# Patient Record
Sex: FEMALE | Employment: PART TIME | URBAN - METROPOLITAN AREA
[De-identification: names, ages, dates, MRNs, and addresses within clinical notes are randomized per-mention and may not be internally consistent; named-entity substitution may affect disease eponyms.]

---
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
## 2020-01-06 ENCOUNTER — APPOINTMENT (OUTPATIENT)
Dept: CT IMAGING | Age: 20
End: 2020-01-06
Payer: COMMERCIAL

## 2020-01-06 ENCOUNTER — HOSPITAL ENCOUNTER (EMERGENCY)
Age: 20
Discharge: HOME | End: 2020-01-07
Payer: COMMERCIAL

## 2020-01-06 LAB
BILIRUBIN URINE: NEGATIVE
BLOOD, URINE: NEGATIVE
CLARITY: ABNORMAL
COLOR: YELLOW
GLUCOSE URINE: NEGATIVE MG/DL
HCG, URINE, POC: NEGATIVE
KETONES, URINE: ABNORMAL MG/DL
LEUKOCYTE ESTERASE, URINE: ABNORMAL
Lab: 0
NEGATIVE QC PASS/FAIL: NORMAL
NITRITE, URINE: NEGATIVE
PH UA: 7 (ref 5–9)
POC CREATININE WHOLE BLOOD: 0.6
POSITIVE QC PASS/FAIL: NORMAL
PROTEIN UA: ABNORMAL MG/DL
SPECIFIC GRAVITY UA: 1.03 (ref 1–1.03)
URINE REFLEX TO CULTURE: YES
UROBILINOGEN, URINE: 2 E.U./DL

## 2020-01-06 PROCEDURE — 74177 CT ABD & PELVIS W/CONTRAST: CPT

## 2020-01-06 PROCEDURE — 80053 COMPREHEN METABOLIC PANEL: CPT

## 2020-01-06 PROCEDURE — 6360000004 HC RX CONTRAST MEDICATION: Performed by: PHYSICIAN ASSISTANT

## 2020-01-06 PROCEDURE — 81001 URINALYSIS AUTO W/SCOPE: CPT

## 2020-01-06 PROCEDURE — 85025 COMPLETE CBC W/AUTO DIFF WBC: CPT

## 2020-01-06 PROCEDURE — 36415 COLL VENOUS BLD VENIPUNCTURE: CPT

## 2020-01-06 PROCEDURE — 99284 EMERGENCY DEPT VISIT MOD MDM: CPT

## 2020-01-06 PROCEDURE — 83690 ASSAY OF LIPASE: CPT

## 2020-01-06 ASSESSMENT — PAIN DESCRIPTION - PAIN TYPE: TYPE: ACUTE PAIN

## 2020-01-06 ASSESSMENT — PAIN DESCRIPTION - LOCATION: LOCATION: ABDOMEN

## 2020-01-06 ASSESSMENT — PAIN SCALES - GENERAL: PAINLEVEL_OUTOF10: 8

## 2020-01-06 ASSESSMENT — PAIN DESCRIPTION - ORIENTATION: ORIENTATION: LOWER

## 2020-01-07 VITALS
BODY MASS INDEX: 20.38 KG/M2 | OXYGEN SATURATION: 99 % | RESPIRATION RATE: 16 BRPM | SYSTOLIC BLOOD PRESSURE: 103 MMHG | HEIGHT: 63 IN | TEMPERATURE: 98.3 F | WEIGHT: 115 LBS | DIASTOLIC BLOOD PRESSURE: 62 MMHG | HEART RATE: 91 BPM

## 2020-01-07 LAB
ALBUMIN SERPL-MCNC: 4.4 G/DL (ref 3.5–4.6)
ALP BLD-CCNC: 74 U/L (ref 40–130)
ALT SERPL-CCNC: 7 U/L (ref 0–33)
ANION GAP SERPL CALCULATED.3IONS-SCNC: 13 MEQ/L (ref 9–15)
ANISOCYTOSIS: ABNORMAL
AST SERPL-CCNC: 15 U/L (ref 0–35)
BACTERIA: ABNORMAL /HPF
BASOPHILS ABSOLUTE: 0.1 K/UL (ref 0–0.2)
BASOPHILS RELATIVE PERCENT: 1.1 %
BILIRUB SERPL-MCNC: 0.5 MG/DL (ref 0.2–0.7)
BUN BLDV-MCNC: 14 MG/DL (ref 6–20)
CALCIUM SERPL-MCNC: 8.8 MG/DL (ref 8.5–9.9)
CHLORIDE BLD-SCNC: 99 MEQ/L (ref 95–107)
CO2: 25 MEQ/L (ref 20–31)
CREAT SERPL-MCNC: 0.47 MG/DL (ref 0.5–0.9)
EOSINOPHILS ABSOLUTE: 0 K/UL (ref 0–0.7)
EOSINOPHILS RELATIVE PERCENT: 0.4 %
EPITHELIAL CELLS, UA: ABNORMAL /HPF (ref 0–5)
GFR AFRICAN AMERICAN: >60
GFR AFRICAN AMERICAN: >60
GFR NON-AFRICAN AMERICAN: >60
GFR NON-AFRICAN AMERICAN: >60
GLOBULIN: 3.2 G/DL (ref 2.3–3.5)
GLUCOSE BLD-MCNC: 81 MG/DL (ref 70–99)
HCT VFR BLD CALC: 28.8 % (ref 37–47)
HEMOGLOBIN: 8.5 G/DL (ref 12–16)
HYPOCHROMIA: ABNORMAL
LIPASE: 22 U/L (ref 12–95)
LYMPHOCYTES ABSOLUTE: 1.1 K/UL (ref 1–4.8)
LYMPHOCYTES RELATIVE PERCENT: 8.8 %
MCH RBC QN AUTO: 18.9 PG (ref 27–31.3)
MCHC RBC AUTO-ENTMCNC: 29.5 % (ref 33–37)
MCV RBC AUTO: 64.1 FL (ref 82–100)
MICROCYTES: ABNORMAL
MONOCYTES ABSOLUTE: 0.5 K/UL (ref 0.2–0.8)
MONOCYTES RELATIVE PERCENT: 4.4 %
MUCUS: PRESENT
NEUTROPHILS ABSOLUTE: 10.5 K/UL (ref 1.4–6.5)
NEUTROPHILS RELATIVE PERCENT: 85.3 %
OVALOCYTES: ABNORMAL
PDW BLD-RTO: 18.3 % (ref 11.5–14.5)
PERFORMED ON: NORMAL
PLATELET # BLD: 383 K/UL (ref 130–400)
POC CREATININE: 0.6 MG/DL (ref 0.6–1.1)
POC SAMPLE TYPE: NORMAL
POIKILOCYTES: ABNORMAL
POLYCHROMASIA: ABNORMAL
POTASSIUM SERPL-SCNC: 3.1 MEQ/L (ref 3.4–4.9)
RBC # BLD: 4.49 M/UL (ref 4.2–5.4)
RBC UA: ABNORMAL /HPF (ref 0–5)
SODIUM BLD-SCNC: 137 MEQ/L (ref 135–144)
TARGET CELLS: ABNORMAL
TOTAL PROTEIN: 7.6 G/DL (ref 6.3–8)
WBC # BLD: 12.3 K/UL (ref 4.5–11)
WBC UA: ABNORMAL /HPF (ref 0–5)

## 2020-01-07 PROCEDURE — 6360000002 HC RX W HCPCS: Performed by: PHYSICIAN ASSISTANT

## 2020-01-07 PROCEDURE — 87086 URINE CULTURE/COLONY COUNT: CPT

## 2020-01-07 PROCEDURE — 2580000003 HC RX 258: Performed by: PHYSICIAN ASSISTANT

## 2020-01-07 PROCEDURE — 96374 THER/PROPH/DIAG INJ IV PUSH: CPT

## 2020-01-07 PROCEDURE — 6360000004 HC RX CONTRAST MEDICATION: Performed by: PHYSICIAN ASSISTANT

## 2020-01-07 PROCEDURE — 87077 CULTURE AEROBIC IDENTIFY: CPT

## 2020-01-07 PROCEDURE — 87186 SC STD MICRODIL/AGAR DIL: CPT

## 2020-01-07 RX ORDER — MORPHINE SULFATE 2 MG/ML
2 INJECTION, SOLUTION INTRAMUSCULAR; INTRAVENOUS
Status: DISCONTINUED | OUTPATIENT
Start: 2020-01-07 | End: 2020-01-07 | Stop reason: HOSPADM

## 2020-01-07 RX ORDER — POLYETHYLENE GLYCOL 3350 17 G/17G
17 POWDER, FOR SOLUTION ORAL DAILY
Qty: 1 BOTTLE | Refills: 0 | Status: SHIPPED | OUTPATIENT
Start: 2020-01-07 | End: 2020-01-14

## 2020-01-07 RX ORDER — 0.9 % SODIUM CHLORIDE 0.9 %
1000 INTRAVENOUS SOLUTION INTRAVENOUS ONCE
Status: COMPLETED | OUTPATIENT
Start: 2020-01-07 | End: 2020-01-07

## 2020-01-07 RX ORDER — ONDANSETRON 2 MG/ML
4 INJECTION INTRAMUSCULAR; INTRAVENOUS ONCE
Status: COMPLETED | OUTPATIENT
Start: 2020-01-07 | End: 2020-01-07

## 2020-01-07 RX ORDER — PROMETHAZINE HYDROCHLORIDE 25 MG/1
25 TABLET ORAL EVERY 6 HOURS PRN
Qty: 20 TABLET | Refills: 0 | Status: SHIPPED | OUTPATIENT
Start: 2020-01-07 | End: 2020-01-14

## 2020-01-07 RX ORDER — DICYCLOMINE HYDROCHLORIDE 10 MG/1
10 CAPSULE ORAL EVERY 6 HOURS PRN
Qty: 20 CAPSULE | Refills: 0 | Status: SHIPPED | OUTPATIENT
Start: 2020-01-07 | End: 2020-06-23

## 2020-01-07 RX ORDER — ACETAMINOPHEN 500 MG
1000 TABLET ORAL EVERY 6 HOURS PRN
Qty: 30 TABLET | Refills: 0 | Status: SHIPPED | OUTPATIENT
Start: 2020-01-07 | End: 2020-06-23

## 2020-01-07 RX ADMIN — SODIUM CHLORIDE 1000 ML: 9 INJECTION, SOLUTION INTRAVENOUS at 00:38

## 2020-01-07 RX ADMIN — ONDANSETRON 4 MG: 2 INJECTION INTRAMUSCULAR; INTRAVENOUS at 00:38

## 2020-01-07 RX ADMIN — IOPAMIDOL 100 ML: 755 INJECTION, SOLUTION INTRAVENOUS at 00:05

## 2020-01-07 RX ADMIN — MORPHINE SULFATE 2 MG: 2 INJECTION, SOLUTION INTRAMUSCULAR; INTRAVENOUS at 00:38

## 2020-01-07 ASSESSMENT — ENCOUNTER SYMPTOMS
ABDOMINAL PAIN: 1
DIARRHEA: 0
NAUSEA: 1
BLOOD IN STOOL: 0
VOICE CHANGE: 0
PHOTOPHOBIA: 0
APNEA: 0
ABDOMINAL DISTENTION: 0
EYE DISCHARGE: 0
SHORTNESS OF BREATH: 0
ANAL BLEEDING: 0
BACK PAIN: 0
COUGH: 0

## 2020-01-07 ASSESSMENT — PAIN SCALES - GENERAL: PAINLEVEL_OUTOF10: 8

## 2020-01-07 NOTE — ED PROVIDER NOTES
Psychiatric/Behavioral: Negative for behavioral problems, self-injury and sleep disturbance. All other systems reviewed and are negative. Except as noted above the remainder of the review of systems was reviewed and negative. PAST MEDICAL HISTORY     Past Medical History:   Diagnosis Date    Anemia     History of blood transfusion          SURGICALHISTORY     No past surgical history on file. CURRENT MEDICATIONS       Previous Medications    FERROUS SULFATE 325 (65 FE) MG TABLET    Take 1 tablet by mouth daily (with breakfast)    VITAMIN D (ERGOCALCIFEROL) 99536 UNITS CAPS CAPSULE    Take 50,000 Units by mouth       ALLERGIES     Patient has no known allergies. FAMILY HISTORY     No family history on file.        SOCIAL HISTORY       Social History     Socioeconomic History    Marital status: Single     Spouse name: Not on file    Number of children: Not on file    Years of education: Not on file    Highest education level: Not on file   Occupational History    Not on file   Social Needs    Financial resource strain: Not on file    Food insecurity:     Worry: Not on file     Inability: Not on file    Transportation needs:     Medical: Not on file     Non-medical: Not on file   Tobacco Use    Smoking status: Current Every Day Smoker     Years: 4.00     Types: Cigars    Smokeless tobacco: Never Used    Tobacco comment: black n milds   Substance and Sexual Activity    Alcohol use: No     Alcohol/week: 0.0 standard drinks    Drug use: No    Sexual activity: Yes     Partners: Male   Lifestyle    Physical activity:     Days per week: Not on file     Minutes per session: Not on file    Stress: Not on file   Relationships    Social connections:     Talks on phone: Not on file     Gets together: Not on file     Attends Yarsanism service: Not on file     Active member of club or organization: Not on file     Attends meetings of clubs or organizations: Not on file     Relationship status: Not on file    Intimate partner violence:     Fear of current or ex partner: Not on file     Emotionally abused: Not on file     Physically abused: Not on file     Forced sexual activity: Not on file   Other Topics Concern    Not on file   Social History Narrative    Not on file       SCREENINGS      @[facility]([id number])@      PHYSICAL EXAM    (up to 7 for level 4, 8 or more for level 5)     ED Triage Vitals [01/06/20 2315]   BP Temp Temp Source Pulse Resp SpO2 Height Weight   (!) 102/58 98.3 °F (36.8 °C) Oral 99 18 100 % 5' 3\" (1.6 m) 115 lb (52.2 kg)       Physical Exam  Vitals signs and nursing note reviewed. Constitutional:       General: She is not in acute distress. Appearance: She is well-developed. HENT:      Head: Normocephalic and atraumatic. Eyes:      General:         Right eye: No discharge. Left eye: No discharge. Pupils: Pupils are equal, round, and reactive to light. Neck:      Musculoskeletal: Normal range of motion and neck supple. Cardiovascular:      Rate and Rhythm: Normal rate and regular rhythm. Heart sounds: Normal heart sounds. Pulmonary:      Effort: Pulmonary effort is normal. No respiratory distress. Breath sounds: Normal breath sounds. No stridor. Abdominal:      General: Bowel sounds are normal. There is no distension. Palpations: Abdomen is soft. Tenderness: There is tenderness. Musculoskeletal: Normal range of motion. Skin:     General: Skin is warm. Findings: No erythema. Neurological:      Mental Status: She is alert and oriented to person, place, and time.          DIAGNOSTIC RESULTS     EKG: All EKG's are interpreted by the Emergency Department Physician who either signs or Co-signsthis chart in the absence of a cardiologist.         RADIOLOGY:   Non-plain filmimages such as CT, Ultrasound and MRI are read by the radiologist. Plain radiographic images are visualized and preliminarily interpreted by the emergency physician with the below findings:     see prelim rad report    Interpretation per the Radiologist below, if available at the time ofthis note:    CT ABDOMEN PELVIS W IV CONTRAST Additional Contrast? None    (Results Pending)         ED BEDSIDE ULTRASOUND:   Performed by ED Physician - none    LABS:  Labs Reviewed   URINE RT REFLEX TO CULTURE - Abnormal; Notable for the following components:       Result Value    Clarity, UA CLOUDY (*)     Ketones, Urine TRACE (*)     Protein, UA TRACE (*)     Urobilinogen, Urine 2.0 (*)     Leukocyte Esterase, Urine MODERATE (*)     All other components within normal limits   COMPREHENSIVE METABOLIC PANEL - Abnormal; Notable for the following components:    Potassium 3.1 (*)     CREATININE 0.47 (*)     All other components within normal limits   CBC WITH AUTO DIFFERENTIAL - Abnormal; Notable for the following components:    WBC 12.3 (*)     Hemoglobin 8.5 (*)     Hematocrit 28.8 (*)     MCV 64.1 (*)     MCH 18.9 (*)     MCHC 29.5 (*)     RDW 18.3 (*)     Neutrophils Absolute 10.5 (*)     All other components within normal limits   MICROSCOPIC URINALYSIS - Abnormal; Notable for the following components:    Bacteria, UA FEW (*)     WBC, UA 20-50 (*)     RBC, UA 3-5 (*)     All other components within normal limits   POCT CREATININE - URINE - Normal   URINE CULTURE   LIPASE   POC PREGNANCY UR-QUAL       All other labs were within normal range or not returned as of this dictation.     EMERGENCY DEPARTMENT COURSE and DIFFERENTIAL DIAGNOSIS/MDM:   Vitals:    Vitals:    01/06/20 2315 01/07/20 0043   BP: (!) 102/58 103/62   Pulse: 99 91   Resp: 18 16   Temp: 98.3 °F (36.8 °C)    TempSrc: Oral    SpO2: 100% 99%   Weight: 115 lb (52.2 kg)    Height: 5' 3\" (1.6 m)             MDM  Number of Diagnoses or Management Options  Anemia, unspecified type:   Constipation, unspecified constipation type:   Lower abdominal pain:   Diagnosis management comments: Justyn with patient regarding follow-up she requests referral to family doctors. We discussed increase fiber in diet we discussed laxatives we discussed follow-up we discussed medications patient does work note. Patient follow-up with primary care return to ER if any symptoms worsen or new symptoms develop. Urine appears contaminated 10-20 epithelial cells discussed with patient she is to follow with primary care for the culture results. Without symptoms no antibiotics at this time. Amount and/or Complexity of Data Reviewed  Clinical lab tests: reviewed and ordered  Tests in the radiology section of CPT®: reviewed and ordered          CONSULTS:  None    PROCEDURES:  Unless otherwise noted below, none     Procedures    FINAL IMPRESSION      1. Lower abdominal pain    2. Anemia, unspecified type    3.  Constipation, unspecified constipation type          DISPOSITION/PLAN   DISPOSITION Decision To Discharge 01/07/2020 01:16:05 AM      PATIENT REFERRED TO:  AILIN Nunes - CNP  [address] [facility]  [phone]    Schedule an appointment as soon as possible for a visit in 1 day      Lisette Murillo [address] [place] [address]  [phone]    Schedule an appointment as soon as possible for a visit in 1 day      Wilbur Estrada MD  [address] [facility] [address] [phone]    Schedule an appointment as soon as possible for a visit in 1 day      [facility]) ED  [address] [facility]  [address] [place] [address] [id number] [phone]    If symptoms worsen      DISCHARGE MEDICATIONS:  New Prescriptions    ACETAMINOPHEN (TYLENOL) 500 MG TABLET    Take 2 tablets by mouth every 6 hours as needed for Pain    DICYCLOMINE (BENTYL) 10 MG CAPSULE    Take 1 capsule by mouth every 6 hours as needed (cramps)    POLYETHYLENE GLYCOL (MIRALAX) POWDER    Take 17 g by mouth daily for 7 days PRN constipation    PROMETHAZINE (PHENERGAN) 25 MG TABLET    Take 1 tablet by mouth every 6 hours as needed for Nausea WARNING:  May cause drowsiness. May impair ability to operate vehicles or machinery. Do not use in combination with alcohol.           (Please note that portions of this note were completed with a voice recognition program.  Efforts were made to edit the dictations but occasionally words are mis-transcribed.)    Velma Contreras PA-C (electronically signed)  Attending Emergency Physician       Velma Contreras PA-C  01/07/20 0076

## 2020-01-07 NOTE — ED NOTES
Patient ambulated to and from bathroom with steady gait  KARON Mao at bedside for assessment      Arnulfo Ochoa RN  01/06/20 7302

## 2020-01-09 LAB
ORGANISM: ABNORMAL
URINE CULTURE, ROUTINE: ABNORMAL

## 2020-06-23 ENCOUNTER — OFFICE VISIT (OUTPATIENT)
Dept: OBGYN CLINIC | Age: 20
End: 2020-06-23
Payer: COMMERCIAL

## 2020-06-23 VITALS — SYSTOLIC BLOOD PRESSURE: 118 MMHG | WEIGHT: 117 LBS | BODY MASS INDEX: 20.73 KG/M2 | DIASTOLIC BLOOD PRESSURE: 82 MMHG

## 2020-06-23 DIAGNOSIS — Z11.3 ROUTINE SCREENING FOR STI (SEXUALLY TRANSMITTED INFECTION): ICD-10-CM

## 2020-06-23 PROCEDURE — 4004F PT TOBACCO SCREEN RCVD TLK: CPT | Performed by: OBSTETRICS & GYNECOLOGY

## 2020-06-23 PROCEDURE — 99213 OFFICE O/P EST LOW 20 MIN: CPT | Performed by: OBSTETRICS & GYNECOLOGY

## 2020-06-23 PROCEDURE — G8427 DOCREV CUR MEDS BY ELIG CLIN: HCPCS | Performed by: OBSTETRICS & GYNECOLOGY

## 2020-06-23 PROCEDURE — G8420 CALC BMI NORM PARAMETERS: HCPCS | Performed by: OBSTETRICS & GYNECOLOGY

## 2020-06-23 ASSESSMENT — ENCOUNTER SYMPTOMS
ABDOMINAL DISTENTION: 0
RESPIRATORY NEGATIVE: 1
DIARRHEA: 0
ALLERGIC/IMMUNOLOGIC NEGATIVE: 1
VOMITING: 0
ANAL BLEEDING: 0
BLOOD IN STOOL: 0
RECTAL PAIN: 0
CONSTIPATION: 0
NAUSEA: 0
ABDOMINAL PAIN: 0
EYES NEGATIVE: 1

## 2020-06-23 NOTE — PROGRESS NOTES
Patient here requesting STD screening. Denies abnormal vaginal discharge , itching or burning. STD screening performed and all questions answered. F/U annual exam.    Vitals:  /82   Wt 117 lb (53.1 kg)   LMP 05/28/2020   BMI 20.73 kg/m²   Past Medical History:   Diagnosis Date    Anemia     History of blood transfusion      No past surgical history on file. Allergies:  Seasonal  No current outpatient medications on file. No current facility-administered medications for this visit.       Social History     Socioeconomic History    Marital status: Single     Spouse name: Not on file    Number of children: Not on file    Years of education: Not on file    Highest education level: Not on file   Occupational History    Not on file   Social Needs    Financial resource strain: Not on file    Food insecurity     Worry: Not on file     Inability: Not on file    Transportation needs     Medical: Not on file     Non-medical: Not on file   Tobacco Use    Smoking status: Current Every Day Smoker     Years: 4.00     Types: Cigars    Smokeless tobacco: Never Used    Tobacco comment: black n milds   Substance and Sexual Activity    Alcohol use: No     Alcohol/week: 0.0 standard drinks    Drug use: No    Sexual activity: Yes     Partners: Male   Lifestyle    Physical activity     Days per week: Not on file     Minutes per session: Not on file    Stress: Not on file   Relationships    Social connections     Talks on phone: Not on file     Gets together: Not on file     Attends Spiritism service: Not on file     Active member of club or organization: Not on file     Attends meetings of clubs or organizations: Not on file     Relationship status: Not on file    Intimate partner violence     Fear of current or ex partner: Not on file     Emotionally abused: Not on file     Physically abused: Not on file     Forced sexual activity: Not on file   Other Topics Concern    Not on file   Social History Narrative    Not on file        Family History   Problem Relation Age of Onset    Breast Cancer Neg Hx     Cancer Neg Hx     Colon Cancer Neg Hx     Diabetes Neg Hx     Hypertension Neg Hx      Labor Neg Hx     Stroke Neg Hx     Ovarian Cancer Neg Hx     Eclampsia Neg Hx     Spont Abortions Neg Hx        Review of Systems   Constitutional: Negative. Negative for activity change, appetite change, chills, diaphoresis, fatigue, fever and unexpected weight change. HENT: Negative. Eyes: Negative. Respiratory: Negative. Cardiovascular: Negative. Gastrointestinal: Negative for abdominal distention, abdominal pain, anal bleeding, blood in stool, constipation, diarrhea, nausea, rectal pain and vomiting. Endocrine: Negative. Genitourinary: Negative for decreased urine volume, difficulty urinating, dyspareunia, dysuria, enuresis, flank pain, frequency, genital sores, hematuria, menstrual problem, pelvic pain, urgency, vaginal bleeding, vaginal discharge and vaginal pain. Musculoskeletal: Negative. Skin: Negative. Allergic/Immunologic: Negative. Neurological: Negative. Hematological: Negative. Psychiatric/Behavioral: Negative. Objective:     Physical Exam  Constitutional:       General: She is not in acute distress. Appearance: She is well-developed. She is not diaphoretic. HENT:      Head: Normocephalic and atraumatic. Eyes:      Conjunctiva/sclera: Conjunctivae normal.   Neck:      Musculoskeletal: Normal range of motion and neck supple. Cardiovascular:      Rate and Rhythm: Normal rate and regular rhythm. Pulmonary:      Effort: Pulmonary effort is normal. No respiratory distress. Genitourinary:     Labia:         Right: No rash, tenderness, lesion or injury. Left: No rash, tenderness, lesion or injury. Vagina: Normal. No signs of injury and foreign body. No vaginal discharge, erythema, tenderness or bleeding.       Comments: No abnormal discharge. No cervical or vaginal lesions. Normal external genitalia. Musculoskeletal: Normal range of motion. General: No tenderness or deformity. Skin:     General: Skin is warm and dry. Coloration: Skin is not pale. Neurological:      Mental Status: She is alert and oriented to person, place, and time. Motor: No abnormal muscle tone. Coordination: Coordination normal.   Psychiatric:         Behavior: Behavior normal.         Thought Content: Thought content normal.         Judgment: Judgment normal.         Assessment:          Diagnosis Orders   1. Routine screening for STI (sexually transmitted infection)  Wet Prep, Genital    C.trachomatis N.gonorrhoeae DNA        Plan:      Medications placedthis encounter:  No orders of the defined types were placed in this encounter. Orders placedthis encounter:  Orders Placed This Encounter   Procedures    Wet Prep, Genital     Standing Status:   Future     Standing Expiration Date:   6/23/2021    C.trachomatis N.gonorrhoeae DNA     Standing Status:   Future     Standing Expiration Date:   6/23/2021     Order Specific Question:   Source:      Answer:   Cervical         Follow up:  Return for Annual.

## 2020-06-24 ENCOUNTER — TELEPHONE (OUTPATIENT)
Dept: OBGYN CLINIC | Age: 20
End: 2020-06-24

## 2020-06-24 RX ORDER — METRONIDAZOLE 500 MG/1
500 TABLET ORAL 2 TIMES DAILY
Qty: 14 TABLET | Refills: 0 | Status: SHIPPED | OUTPATIENT
Start: 2020-06-24 | End: 2020-07-01

## 2020-06-29 LAB
C TRACH DNA GENITAL QL NAA+PROBE: NEGATIVE
N. GONORRHOEAE DNA: NEGATIVE

## 2020-11-27 ENCOUNTER — HOSPITAL ENCOUNTER (EMERGENCY)
Age: 20
Discharge: HOME | End: 2020-11-27
Payer: COMMERCIAL

## 2020-11-27 VITALS
HEART RATE: 74 BPM | SYSTOLIC BLOOD PRESSURE: 122 MMHG | BODY MASS INDEX: 21.09 KG/M2 | RESPIRATION RATE: 18 BRPM | WEIGHT: 119 LBS | DIASTOLIC BLOOD PRESSURE: 78 MMHG | TEMPERATURE: 98.3 F | HEIGHT: 63 IN | OXYGEN SATURATION: 100 %

## 2020-11-27 LAB
BACTERIA: ABNORMAL /HPF
BILIRUBIN URINE: NEGATIVE
BLOOD, URINE: NEGATIVE
CHP ED QC CHECK: NORMAL
CLARITY: ABNORMAL
CLUE CELLS: ABNORMAL
COLOR: YELLOW
EPITHELIAL CELLS, UA: ABNORMAL /HPF
GLUCOSE URINE: NEGATIVE MG/DL
KETONES, URINE: ABNORMAL MG/DL
LEUKOCYTE ESTERASE, URINE: ABNORMAL
MUCUS: PRESENT /LPF
NITRITE, URINE: POSITIVE
PH UA: 5.5 (ref 5–9)
PREGNANCY TEST URINE, POC: NEGATIVE
PROTEIN UA: ABNORMAL MG/DL
RBC UA: ABNORMAL /HPF (ref 0–2)
SARS-COV-2, PCR: NOT DETECTED
SPECIFIC GRAVITY UA: 1.03 (ref 1–1.03)
TRICHOMONAS PREP: ABNORMAL
TRICHOMONAS VAGINALIS SCREEN: NEGATIVE
URINE REFLEX TO CULTURE: YES
UROBILINOGEN, URINE: 1 E.U./DL
WBC UA: ABNORMAL /HPF (ref 0–5)
YEAST WET PREP: ABNORMAL

## 2020-11-27 PROCEDURE — 87086 URINE CULTURE/COLONY COUNT: CPT

## 2020-11-27 PROCEDURE — 87210 SMEAR WET MOUNT SALINE/INK: CPT

## 2020-11-27 PROCEDURE — 87591 N.GONORRHOEAE DNA AMP PROB: CPT

## 2020-11-27 PROCEDURE — 87808 TRICHOMONAS ASSAY W/OPTIC: CPT

## 2020-11-27 PROCEDURE — 87077 CULTURE AEROBIC IDENTIFY: CPT

## 2020-11-27 PROCEDURE — 87186 SC STD MICRODIL/AGAR DIL: CPT

## 2020-11-27 PROCEDURE — 87491 CHLMYD TRACH DNA AMP PROBE: CPT

## 2020-11-27 PROCEDURE — U0002 COVID-19 LAB TEST NON-CDC: HCPCS

## 2020-11-27 PROCEDURE — 6370000000 HC RX 637 (ALT 250 FOR IP): Performed by: PERSONAL EMERGENCY RESPONSE ATTENDANT

## 2020-11-27 PROCEDURE — 99283 EMERGENCY DEPT VISIT LOW MDM: CPT

## 2020-11-27 PROCEDURE — 96372 THER/PROPH/DIAG INJ SC/IM: CPT

## 2020-11-27 PROCEDURE — 6360000002 HC RX W HCPCS: Performed by: PERSONAL EMERGENCY RESPONSE ATTENDANT

## 2020-11-27 PROCEDURE — 81001 URINALYSIS AUTO W/SCOPE: CPT

## 2020-11-27 RX ORDER — AZITHROMYCIN 250 MG/1
1000 TABLET, FILM COATED ORAL ONCE
Status: COMPLETED | OUTPATIENT
Start: 2020-11-27 | End: 2020-11-27

## 2020-11-27 RX ORDER — CEFTRIAXONE SODIUM 250 MG/1
250 INJECTION, POWDER, FOR SOLUTION INTRAMUSCULAR; INTRAVENOUS ONCE
Status: COMPLETED | OUTPATIENT
Start: 2020-11-27 | End: 2020-11-27

## 2020-11-27 RX ORDER — CLOTRIMAZOLE 1 %
CREAM WITH APPLICATOR VAGINAL
Qty: 45 G | Refills: 0 | Status: SHIPPED | OUTPATIENT
Start: 2020-11-27 | End: 2022-07-06

## 2020-11-27 RX ADMIN — CEFTRIAXONE SODIUM 250 MG: 250 INJECTION, POWDER, FOR SOLUTION INTRAMUSCULAR; INTRAVENOUS at 04:53

## 2020-11-27 RX ADMIN — AZITHROMYCIN MONOHYDRATE 1000 MG: 250 TABLET ORAL at 04:54

## 2020-11-27 ASSESSMENT — ENCOUNTER SYMPTOMS
BLOOD IN STOOL: 0
COUGH: 0
RHINORRHEA: 0
ABDOMINAL PAIN: 0
COLOR CHANGE: 0
VOMITING: 0
NAUSEA: 0
DIARRHEA: 0
SHORTNESS OF BREATH: 0
SORE THROAT: 0

## 2020-11-27 NOTE — ED PROVIDER NOTES
[facility]  eMERGENCY dEPARTMENT eNCOUnter      Pt Name: Kayleen Gallego  MRN: [id number]  [place] 2000  Date of evaluation: 2020  Provider: KARON Malave      HISTORY OF PRESENT ILLNESS    Kayleen Gallego is a 21 y.o. female with PMHx of anemia presents to the emergency department with STD evaluation. Patient states for 1 week she has been having vaginal itching with yellow discharge and odor. She does also have dysuria. She is sexually active with 1 other person, they do not wear protection, he is also being seen for STD evaluation. He thinks he was exposed to gonorrhea from another female. She denies fevers, chills, abdominal pain, back pain. HPI    Nursing Notes were reviewed. REVIEW OF SYSTEMS       Review of Systems   Constitutional: Negative for appetite change, chills and fever. HENT: Negative for congestion, rhinorrhea and sore throat. Respiratory: Negative for cough and shortness of breath. Cardiovascular: Negative for chest pain. Gastrointestinal: Negative for abdominal pain, blood in stool, diarrhea, nausea and vomiting. Genitourinary: Positive for dysuria and vaginal discharge. Negative for difficulty urinating. Musculoskeletal: Negative for neck stiffness. Skin: Negative for color change and rash. Neurological: Negative for dizziness, syncope, weakness, light-headedness, numbness and headaches. All other systems reviewed and are negative. PAST MEDICAL HISTORY     Past Medical History:   Diagnosis Date    Anemia     History of blood transfusion          SURGICAL HISTORY     History reviewed. No pertinent surgical history.       CURRENT MEDICATIONS       Previous Medications    No medications on file       ALLERGIES     Seasonal    FAMILY HISTORY       Family History   Problem Relation Age of Onset    Breast Cancer Neg Hx     Cancer Neg Hx     Colon Cancer Neg Hx     Diabetes Neg Hx     Hypertension Neg Hx      Labor Neg Hx     Stroke Neg Hx     Ovarian Cancer Neg Hx     Eclampsia Neg Hx     Spont Abortions Neg Hx           SOCIAL HISTORY       Social History     Socioeconomic History    Marital status: Single     Spouse name: None    Number of children: None    Years of education: None    Highest education level: None   Occupational History    None   Social Needs    Financial resource strain: None    Food insecurity     Worry: None     Inability: None    Transportation needs     Medical: None     Non-medical: None   Tobacco Use    Smoking status: Current Every Day Smoker     Years: 4.00     Types: Cigars    Smokeless tobacco: Never Used    Tobacco comment: black n milds   Substance and Sexual Activity    Alcohol use: No     Alcohol/week: 0.0 standard drinks    Drug use: No    Sexual activity: Yes     Partners: Male   Lifestyle    Physical activity     Days per week: None     Minutes per session: None    Stress: None   Relationships    Social connections     Talks on phone: None     Gets together: None     Attends Pentecostalism service: None     Active member of club or organization: None     Attends meetings of clubs or organizations: None     Relationship status: None    Intimate partner violence     Fear of current or ex partner: None     Emotionally abused: None     Physically abused: None     Forced sexual activity: None   Other Topics Concern    None   Social History Narrative    None         PHYSICAL EXAM         ED Triage Vitals [11/27/20 0231]   BP Temp Temp Source Pulse Resp SpO2 Height Weight   124/78 98.3 °F (36.8 °C) Oral 74 18 100 % 5' 3\" (1.6 m) 119 lb (54 kg)       Physical Exam  Constitutional:       Appearance: She is well-developed. HENT:      Head: Normocephalic and atraumatic. Eyes:      Conjunctiva/sclera: Conjunctivae normal.      Pupils: Pupils are equal, round, and reactive to light. Neck:      Musculoskeletal: Normal range of motion and neck supple.       Trachea: No tracheal deviation. Cardiovascular:      Heart sounds: Normal heart sounds. Pulmonary:      Effort: Pulmonary effort is normal. No respiratory distress. Breath sounds: Normal breath sounds. No stridor. Abdominal:      General: Bowel sounds are normal. There is no distension. Palpations: Abdomen is soft. There is no mass. Tenderness: There is no abdominal tenderness. There is no guarding or rebound. Genitourinary:     Vagina: Vaginal discharge present. Comments: Mild amount of white vaginal discharge throughout vaginal vault, slight odor noted. Musculoskeletal: Normal range of motion. Skin:     General: Skin is warm and dry. Capillary Refill: Capillary refill takes less than 2 seconds. Findings: No rash. Neurological:      Mental Status: She is alert and oriented to person, place, and time. Deep Tendon Reflexes: Reflexes are normal and symmetric. Psychiatric:         Behavior: Behavior normal.         Thought Content:  Thought content normal.         Judgment: Judgment normal.         DIAGNOSTIC RESULTS     EKG:All EKG's are interpreted by the Emergency Department Physician who either signs or Co-signs this chart in the absence of a cardiologist.        RADIOLOGY:   Non-plain film images such as CT, Ultrasound and MRI are read by theradiologist. Plain radiographic images are visualized and preliminarily interpreted by the emergency physician with the below findings:    Interpretation per theRadiologist below, if available at the time of this note:    No orders to display           LABS:  Labs Reviewed   WET PREP, GENITAL - Abnormal; Notable for the following components:       Result Value    Yeast, Wet Prep 2+ (*)     All other components within normal limits   POCT URINE PREGNANCY - Normal   C.TRACHOMATIS N.GONORRHOEAE DNA   TRICHOMONAS BY EIA   URINE RT REFLEX TO CULTURE   COVID-19   COVID-19       All other labs were within normal range or not returned as of this dictation. EMERGENCY DEPARTMENT COURSE and DIFFERENTIAL DIAGNOSIS/MDM:   Vitals:    Vitals:    11/27/20 0231   BP: 124/78   Pulse: 74   Resp: 18   Temp: 98.3 °F (36.8 °C)   TempSrc: Oral   SpO2: 100%   Weight: 119 lb (54 kg)   Height: 5' 3\" (1.6 m)         MDM    Patient wants to be treated with Rocephin and Zithromax. She does have a yeast infection and was placed on clotrimazole. She is requesting to be Covid tested due to work. standard anticipatory guidance given to patient upon discharge. Have given them a specific time frame in which to follow-up and who to follow-up with. I have also advised them that they should return to the emergency department if they get worse, or not getting better or develop any new or concerning symptoms. Patient demonstrates understanding. 12/1: UA culture noted with >100,000 Klebsiella. Bactrim DS called into pharmacy [facility]        CRITICAL CARE TIME   Total Critical Caretime was 0 minutes, excluding separately reportable procedures. There was a high probability of clinically significant/life threatening deterioration in the patient's condition which required my urgent intervention. Procedures    FINAL IMPRESSION      1. Encounter for assessment of STD exposure    2. Yeast infection of the vagina          DISPOSITION/PLAN   DISPOSITION Decision To Discharge 11/27/2020 04:42:24 AM      PATIENT REFERRED TO:  Mary Jo Echavarria MD  [id number] [facility] [address] [phone]            DISCHARGE MEDICATIONS:  New Prescriptions    CLOTRIMAZOLE (LOTRIMIN) 1 % VAGINAL CREAM    Place vaginally 2 times daily x 7 days          (Please notethat portions of this note were completed with a voice recognition program.  Efforts were made to edit the dictations but occasionally words are mis-transcribed. )    KARON Griffin (electronically signed)  Emergency Physician Assistant         [place] Caban, [address]  11/27/20 KARON Shoemaker  12/01/20 [id number]

## 2020-11-27 NOTE — ED TRIAGE NOTES
Patient to ED with c/o possible STD  Patient states she noticed symptoms last week  Patient c/o burning with urination and discharge  Patient afebrile  Skin warm and dry

## 2020-11-29 LAB
ORGANISM: ABNORMAL
URINE CULTURE, ROUTINE: ABNORMAL

## 2020-12-02 LAB
C TRACH DNA GENITAL QL NAA+PROBE: NEGATIVE
N. GONORRHOEAE DNA: NEGATIVE

## 2021-01-08 ENCOUNTER — HOSPITAL ENCOUNTER (EMERGENCY)
Age: 21
Discharge: HOME | End: 2021-01-08
Payer: COMMERCIAL

## 2021-01-08 ENCOUNTER — APPOINTMENT (OUTPATIENT)
Dept: GENERAL RADIOLOGY | Age: 21
End: 2021-01-08
Payer: COMMERCIAL

## 2021-01-08 VITALS
HEART RATE: 88 BPM | OXYGEN SATURATION: 99 % | BODY MASS INDEX: 19.49 KG/M2 | TEMPERATURE: 98.1 F | WEIGHT: 110 LBS | HEIGHT: 63 IN | RESPIRATION RATE: 23 BRPM | SYSTOLIC BLOOD PRESSURE: 95 MMHG | DIASTOLIC BLOOD PRESSURE: 73 MMHG

## 2021-01-08 DIAGNOSIS — F41.1 ANXIETY STATE: ICD-10-CM

## 2021-01-08 DIAGNOSIS — E86.0 MILD DEHYDRATION: ICD-10-CM

## 2021-01-08 DIAGNOSIS — N30.00 ACUTE CYSTITIS WITHOUT HEMATURIA: Primary | ICD-10-CM

## 2021-01-08 LAB
ALBUMIN SERPL-MCNC: 4.6 G/DL (ref 3.5–4.6)
ALP BLD-CCNC: 91 U/L (ref 40–130)
ALT SERPL-CCNC: 6 U/L (ref 0–33)
AMPHETAMINE SCREEN, URINE: POSITIVE
ANION GAP SERPL CALCULATED.3IONS-SCNC: 15 MEQ/L (ref 9–15)
AST SERPL-CCNC: 25 U/L (ref 0–35)
ATYPICAL LYMPHOCYTE RELATIVE PERCENT: 4 %
BACTERIA: ABNORMAL /HPF
BARBITURATE SCREEN URINE: ABNORMAL
BASOPHILS ABSOLUTE: 0 K/UL (ref 0–0.2)
BASOPHILS RELATIVE PERCENT: 0.8 %
BENZODIAZEPINE SCREEN, URINE: ABNORMAL
BILIRUB SERPL-MCNC: 0.7 MG/DL (ref 0.2–0.7)
BILIRUBIN URINE: NEGATIVE
BLOOD, URINE: ABNORMAL
BUN BLDV-MCNC: 17 MG/DL (ref 6–20)
CALCIUM SERPL-MCNC: 9.6 MG/DL (ref 8.5–9.9)
CANNABINOID SCREEN URINE: ABNORMAL
CHLORIDE BLD-SCNC: 103 MEQ/L (ref 95–107)
CLARITY: ABNORMAL
CO2: 22 MEQ/L (ref 20–31)
COCAINE METABOLITE SCREEN URINE: ABNORMAL
COLOR: YELLOW
CREAT SERPL-MCNC: 0.84 MG/DL (ref 0.5–0.9)
D DIMER: <0.27 MG/L FEU (ref 0–0.5)
EKG ATRIAL RATE: 96 BPM
EKG P AXIS: 66 DEGREES
EKG P-R INTERVAL: 144 MS
EKG Q-T INTERVAL: 354 MS
EKG QRS DURATION: 80 MS
EKG QTC CALCULATION (BAZETT): 447 MS
EKG R AXIS: 81 DEGREES
EKG T AXIS: 63 DEGREES
EKG VENTRICULAR RATE: 96 BPM
EOSINOPHILS ABSOLUTE: 0.1 K/UL (ref 0–0.7)
EOSINOPHILS RELATIVE PERCENT: 1 %
EPITHELIAL CELLS, UA: ABNORMAL /HPF (ref 0–5)
GFR AFRICAN AMERICAN: >60
GFR NON-AFRICAN AMERICAN: >60
GLOBULIN: 3.5 G/DL (ref 2.3–3.5)
GLUCOSE BLD-MCNC: 78 MG/DL (ref 70–99)
GLUCOSE URINE: NEGATIVE MG/DL
HCG, URINE, POC: NEGATIVE
HCT VFR BLD CALC: 34.4 % (ref 37–47)
HEMOGLOBIN: 10.8 G/DL (ref 12–16)
HYALINE CASTS: ABNORMAL /HPF (ref 0–5)
HYPOCHROMIA: ABNORMAL
KETONES, URINE: 40 MG/DL
LEUKOCYTE ESTERASE, URINE: ABNORMAL
LYMPHOCYTES ABSOLUTE: 2.2 K/UL (ref 1–4.8)
LYMPHOCYTES RELATIVE PERCENT: 26 %
Lab: ABNORMAL
Lab: NORMAL
MCH RBC QN AUTO: 20.1 PG (ref 27–31.3)
MCHC RBC AUTO-ENTMCNC: 31.3 % (ref 33–37)
MCV RBC AUTO: 64.1 FL (ref 82–100)
METHADONE SCREEN, URINE: ABNORMAL
MICROCYTES: ABNORMAL
MONOCYTES ABSOLUTE: 0.4 K/UL (ref 0.2–0.8)
MONOCYTES RELATIVE PERCENT: 4.9 %
NEGATIVE QC PASS/FAIL: NORMAL
NEUTROPHILS ABSOLUTE: 4.8 K/UL (ref 1.4–6.5)
NEUTROPHILS RELATIVE PERCENT: 65 %
NITRITE, URINE: POSITIVE
OPIATE SCREEN URINE: ABNORMAL
OXYCODONE URINE: ABNORMAL
PDW BLD-RTO: 20.1 % (ref 11.5–14.5)
PH UA: 6.5 (ref 5–9)
PHENCYCLIDINE SCREEN URINE: ABNORMAL
PLATELET # BLD: 538 K/UL (ref 130–400)
PLATELET SLIDE REVIEW: ABNORMAL
POIKILOCYTES: ABNORMAL
POSITIVE QC PASS/FAIL: NORMAL
POTASSIUM SERPL-SCNC: 3.8 MEQ/L (ref 3.4–4.9)
PROPOXYPHENE SCREEN: ABNORMAL
PROTEIN UA: 100 MG/DL
RBC # BLD: 5.36 M/UL (ref 4.2–5.4)
RBC UA: ABNORMAL /HPF (ref 0–2)
SODIUM BLD-SCNC: 140 MEQ/L (ref 135–144)
SPECIFIC GRAVITY UA: 1.02 (ref 1–1.03)
TOTAL PROTEIN: 8.1 G/DL (ref 6.3–8)
TROPONIN: <0.01 NG/ML (ref 0–0.01)
TSH SERPL DL<=0.05 MIU/L-ACNC: 1.98 UIU/ML (ref 0.44–3.86)
URINE REFLEX TO CULTURE: YES
UROBILINOGEN, URINE: 1 E.U./DL
VACUOLATED NEUTROPHILS: PRESENT
WBC # BLD: 7.4 K/UL (ref 4.5–11)
WBC UA: >100 /HPF (ref 0–5)

## 2021-01-08 PROCEDURE — 87086 URINE CULTURE/COLONY COUNT: CPT

## 2021-01-08 PROCEDURE — 96375 TX/PRO/DX INJ NEW DRUG ADDON: CPT

## 2021-01-08 PROCEDURE — 84443 ASSAY THYROID STIM HORMONE: CPT

## 2021-01-08 PROCEDURE — 87186 SC STD MICRODIL/AGAR DIL: CPT

## 2021-01-08 PROCEDURE — 99282 EMERGENCY DEPT VISIT SF MDM: CPT

## 2021-01-08 PROCEDURE — 96365 THER/PROPH/DIAG IV INF INIT: CPT

## 2021-01-08 PROCEDURE — 2580000003 HC RX 258: Performed by: PHYSICIAN ASSISTANT

## 2021-01-08 PROCEDURE — 80053 COMPREHEN METABOLIC PANEL: CPT

## 2021-01-08 PROCEDURE — 71045 X-RAY EXAM CHEST 1 VIEW: CPT

## 2021-01-08 PROCEDURE — 36415 COLL VENOUS BLD VENIPUNCTURE: CPT

## 2021-01-08 PROCEDURE — 81001 URINALYSIS AUTO W/SCOPE: CPT

## 2021-01-08 PROCEDURE — 80307 DRUG TEST PRSMV CHEM ANLYZR: CPT

## 2021-01-08 PROCEDURE — 6360000002 HC RX W HCPCS: Performed by: NURSE PRACTITIONER

## 2021-01-08 PROCEDURE — 84484 ASSAY OF TROPONIN QUANT: CPT

## 2021-01-08 PROCEDURE — 96361 HYDRATE IV INFUSION ADD-ON: CPT

## 2021-01-08 PROCEDURE — 85025 COMPLETE CBC W/AUTO DIFF WBC: CPT

## 2021-01-08 PROCEDURE — 93005 ELECTROCARDIOGRAM TRACING: CPT

## 2021-01-08 PROCEDURE — 6360000002 HC RX W HCPCS: Performed by: PHYSICIAN ASSISTANT

## 2021-01-08 PROCEDURE — 87077 CULTURE AEROBIC IDENTIFY: CPT

## 2021-01-08 PROCEDURE — 85379 FIBRIN DEGRADATION QUANT: CPT

## 2021-01-08 PROCEDURE — 2580000003 HC RX 258: Performed by: NURSE PRACTITIONER

## 2021-01-08 RX ORDER — SULFAMETHOXAZOLE AND TRIMETHOPRIM 800; 160 MG/1; MG/1
1 TABLET ORAL 2 TIMES DAILY
Qty: 14 TABLET | Refills: 0 | Status: SHIPPED | OUTPATIENT
Start: 2021-01-08 | End: 2021-01-15

## 2021-01-08 RX ORDER — ONDANSETRON 4 MG/1
4 TABLET, ORALLY DISINTEGRATING ORAL EVERY 8 HOURS PRN
Qty: 9 TABLET | Refills: 0 | Status: SHIPPED | OUTPATIENT
Start: 2021-01-08 | End: 2021-01-11

## 2021-01-08 RX ORDER — SULFAMETHOXAZOLE AND TRIMETHOPRIM 800; 160 MG/1; MG/1
1 TABLET ORAL 2 TIMES DAILY
Qty: 14 TABLET | Refills: 0 | Status: SHIPPED | OUTPATIENT
Start: 2021-01-08 | End: 2021-01-08 | Stop reason: SDUPTHER

## 2021-01-08 RX ORDER — 0.9 % SODIUM CHLORIDE 0.9 %
1000 INTRAVENOUS SOLUTION INTRAVENOUS ONCE
Status: COMPLETED | OUTPATIENT
Start: 2021-01-08 | End: 2021-01-08

## 2021-01-08 RX ORDER — ONDANSETRON 4 MG/1
4 TABLET, ORALLY DISINTEGRATING ORAL EVERY 8 HOURS PRN
Qty: 9 TABLET | Refills: 0 | Status: SHIPPED | OUTPATIENT
Start: 2021-01-08 | End: 2021-01-08 | Stop reason: SDUPTHER

## 2021-01-08 RX ORDER — LORAZEPAM 2 MG/ML
1 INJECTION INTRAMUSCULAR ONCE
Status: COMPLETED | OUTPATIENT
Start: 2021-01-08 | End: 2021-01-08

## 2021-01-08 RX ADMIN — CEFTRIAXONE SODIUM 1 G: 1 INJECTION, POWDER, FOR SOLUTION INTRAMUSCULAR; INTRAVENOUS at 19:56

## 2021-01-08 RX ADMIN — SODIUM CHLORIDE 1000 ML: 9 INJECTION, SOLUTION INTRAVENOUS at 17:42

## 2021-01-08 RX ADMIN — LORAZEPAM 1 MG: 2 INJECTION INTRAMUSCULAR; INTRAVENOUS at 17:44

## 2021-01-08 ASSESSMENT — ENCOUNTER SYMPTOMS
PHOTOPHOBIA: 0
RHINORRHEA: 0
SHORTNESS OF BREATH: 0
SORE THROAT: 0
VOMITING: 0
CHEST TIGHTNESS: 1
DIARRHEA: 0
BACK PAIN: 0
ABDOMINAL PAIN: 0
EYE PAIN: 0
NAUSEA: 0
COUGH: 0

## 2021-01-08 NOTE — ED PROVIDER NOTES
[address] [facility] ED  eMERGENCY dEPARTMENTeNCOUnter      Pt Name: Tabatha Dolan  MRN: [id number]  [place] 2000  Date ofevaluation: 1/8/2021  Provider: Leilani Louis       Chief Complaint   Patient presents with    Anxiety     since this morning. HISTORY OF PRESENT ILLNESS   (Location/Symptom, Timing/Onset,Context/Setting, Quality, Duration, Modifying Factors, Severity)  Note limiting factors. Tabatha Dolan is a 21 y.o. female who presents to the emergency department lightheadedness dizziness chest tightness shortness of breath that started this morning. Patient is concerned that she might need a blood transfusion because she has not been taking her iron supplements and she has history of anemia. Patient states she does feel very anxious and worked up. She denies any recent illness fevers cough sore throat or exposure to Covid. HPI    NursingNotes were reviewed. REVIEW OF SYSTEMS    (2-9 systems for level 4, 10 or more for level 5)     Review of Systems   Constitutional: Negative for chills, diaphoresis, fatigue and fever. HENT: Negative for congestion, rhinorrhea and sore throat. Eyes: Negative for photophobia and pain. Respiratory: Positive for chest tightness. Negative for cough and shortness of breath. Cardiovascular: Positive for chest pain. Negative for palpitations. Gastrointestinal: Negative for abdominal pain, diarrhea, nausea and vomiting. Genitourinary: Negative for dysuria and flank pain. Musculoskeletal: Negative for back pain. Skin: Negative for rash. Neurological: Positive for light-headedness. Negative for dizziness and headaches. Psychiatric/Behavioral: The patient is nervous/anxious. All other systems reviewed and are negative. Except as noted above the remainder of the review of systems was reviewed and negative.        PAST MEDICAL HISTORY     Past Medical History:   Diagnosis Date    Anemia     History of blood transfusion          SURGICALHISTORY     History reviewed. No pertinent surgical history.       CURRENT MEDICATIONS       Current Discharge Medication List      CONTINUE these medications which have NOT CHANGED    Details   clotrimazole (LOTRIMIN) 1 % vaginal cream Place vaginally 2 times daily x 7 days  Qty: 45 g, Refills: 0             ALLERGIES     Seasonal    FAMILY HISTORY       Family History   Problem Relation Age of Onset    Breast Cancer Neg Hx     Cancer Neg Hx     Colon Cancer Neg Hx     Diabetes Neg Hx     Hypertension Neg Hx      Labor Neg Hx     Stroke Neg Hx     Ovarian Cancer Neg Hx     Eclampsia Neg Hx     Spont Abortions Neg Hx           SOCIAL HISTORY       Social History     Socioeconomic History    Marital status: Single     Spouse name: None    Number of children: None    Years of education: None    Highest education level: None   Occupational History    None   Social Needs    Financial resource strain: None    Food insecurity     Worry: None     Inability: None    Transportation needs     Medical: None     Non-medical: None   Tobacco Use    Smoking status: Current Every Day Smoker     Years: 4.00     Types: Cigars    Smokeless tobacco: Never Used    Tobacco comment: black n milds   Substance and Sexual Activity    Alcohol use: No     Alcohol/week: 0.0 standard drinks    Drug use: No    Sexual activity: Yes     Partners: Male   Lifestyle    Physical activity     Days per week: None     Minutes per session: None    Stress: None   Relationships    Social connections     Talks on phone: None     Gets together: None     Attends Christian service: None     Active member of club or organization: None     Attends meetings of clubs or organizations: None     Relationship status: None    Intimate partner violence     Fear of current or ex partner: None     Emotionally abused: None     Physically abused: None     Forced sexual activity: None visualized and preliminarily interpreted by the emergency physician with the below findings:        Interpretation per the Radiologist below, if available at the time ofthis note:    XR CHEST PORTABLE    (Results Pending)         ED BEDSIDE ULTRASOUND:   Performed by ED Physician - none    LABS:  Labs Reviewed   COMPREHENSIVE METABOLIC PANEL - Abnormal; Notable for the following components:       Result Value    Total Protein 8.1 (*)     All other components within normal limits   CBC WITH AUTO DIFFERENTIAL - Abnormal; Notable for the following components:    Hemoglobin 10.8 (*)     Hematocrit 34.4 (*)     MCV 64.1 (*)     MCH 20.1 (*)     MCHC 31.3 (*)     RDW 20.1 (*)     Platelets 998 (*)     All other components within normal limits   URINE RT REFLEX TO CULTURE - Abnormal; Notable for the following components:    Clarity, UA TURBID (*)     Ketones, Urine 40 (*)     Blood, Urine MODERATE (*)     Protein,  (*)     Nitrite, Urine POSITIVE (*)     Leukocyte Esterase, Urine LARGE (*)     All other components within normal limits   URINE DRUG SCREEN - Abnormal; Notable for the following components:    Amphetamine Screen, Urine POSITIVE (*)     All other components within normal limits   MICROSCOPIC URINALYSIS - Abnormal; Notable for the following components:    RBC, UA 20-50 (*)     Bacteria, UA MANY (*)     WBC, UA >100 (*)     All other components within normal limits   CULTURE, URINE   TROPONIN   D-DIMER, QUANTITATIVE   TSH WITHOUT REFLEX   POC PREGNANCY UR-QUAL       All other labs were within normal range or not returned as of this dictation. EMERGENCY DEPARTMENT COURSE and DIFFERENTIAL DIAGNOSIS/MDM:   Vitals:    Vitals:    01/08/21 1715 01/08/21 1730 01/08/21 1745 01/08/21 1800   BP:    108/80   Pulse: 94 103 73 100   Resp: 15 17 25 18   Temp:       TempSrc:       SpO2:   100%    Weight:       Height:           AMBER Aaron assumed care of this patient at 1800 hrs. awaiting lab results.   Per

## 2021-01-08 NOTE — ED NOTES
Pt up to restroom at this time. Pt has a unsteady gait as observed by this RN.      Thai Vanegas RN  01/08/21 7504

## 2021-01-08 NOTE — ED NOTES
urine obtained by this RN, labeled and sent to lab via tube system.       Ladarius Yadav RN  01/08/21 [id number]

## 2021-01-08 NOTE — ED TRIAGE NOTES
Patient arrived from home via family with complaint of panic attack while at work that started this morning. Patient A&OX4. Skin pink, warm, and dry.

## 2021-01-08 NOTE — ED NOTES
Labs obtained by this RN, labeled and sent to lab via tube system.       Chelsi Robins RN  01/08/21 7428

## 2021-01-11 LAB
ORGANISM: ABNORMAL
URINE CULTURE, ROUTINE: ABNORMAL

## 2022-07-06 ENCOUNTER — APPOINTMENT (OUTPATIENT)
Dept: GENERAL RADIOLOGY | Age: 22
End: 2022-07-06
Payer: COMMERCIAL

## 2022-07-06 ENCOUNTER — HOSPITAL ENCOUNTER (EMERGENCY)
Age: 22
Discharge: HOME | End: 2022-07-06
Payer: COMMERCIAL

## 2022-07-06 VITALS
TEMPERATURE: 98 F | HEIGHT: 63 IN | DIASTOLIC BLOOD PRESSURE: 79 MMHG | HEART RATE: 96 BPM | OXYGEN SATURATION: 100 % | BODY MASS INDEX: 23.92 KG/M2 | WEIGHT: 135 LBS | SYSTOLIC BLOOD PRESSURE: 107 MMHG | RESPIRATION RATE: 16 BRPM

## 2022-07-06 DIAGNOSIS — J06.9 ACUTE UPPER RESPIRATORY INFECTION: Primary | ICD-10-CM

## 2022-07-06 LAB
INFLUENZA A BY PCR: NEGATIVE
INFLUENZA B BY PCR: NEGATIVE
SARS-COV-2, NAAT: NOT DETECTED
STREP GRP A PCR: NEGATIVE

## 2022-07-06 PROCEDURE — 87635 SARS-COV-2 COVID-19 AMP PRB: CPT

## 2022-07-06 PROCEDURE — 87651 STREP A DNA AMP PROBE: CPT

## 2022-07-06 PROCEDURE — 71045 X-RAY EXAM CHEST 1 VIEW: CPT

## 2022-07-06 PROCEDURE — 99284 EMERGENCY DEPT VISIT MOD MDM: CPT

## 2022-07-06 PROCEDURE — 87502 INFLUENZA DNA AMP PROBE: CPT

## 2022-07-06 RX ORDER — AZITHROMYCIN 250 MG/1
TABLET, FILM COATED ORAL
Qty: 6 TABLET | Refills: 0 | Status: SHIPPED | OUTPATIENT
Start: 2022-07-06 | End: 2022-07-16

## 2022-07-06 ASSESSMENT — ENCOUNTER SYMPTOMS
VOMITING: 0
DIARRHEA: 0
SHORTNESS OF BREATH: 0
COUGH: 1
RHINORRHEA: 1
ABDOMINAL PAIN: 0
SINUS PAIN: 1
NAUSEA: 0
BACK PAIN: 0
TROUBLE SWALLOWING: 0

## 2022-07-06 ASSESSMENT — PAIN DESCRIPTION - DESCRIPTORS: DESCRIPTORS: ACHING

## 2022-07-06 ASSESSMENT — PAIN DESCRIPTION - PAIN TYPE: TYPE: ACUTE PAIN

## 2022-07-06 ASSESSMENT — PAIN DESCRIPTION - ONSET: ONSET: SUDDEN

## 2022-07-06 ASSESSMENT — PAIN DESCRIPTION - LOCATION: LOCATION: CHEST

## 2022-07-06 ASSESSMENT — PAIN DESCRIPTION - FREQUENCY: FREQUENCY: INTERMITTENT

## 2022-07-06 ASSESSMENT — PAIN - FUNCTIONAL ASSESSMENT: PAIN_FUNCTIONAL_ASSESSMENT: 0-10

## 2022-07-06 ASSESSMENT — PAIN SCALES - GENERAL: PAINLEVEL_OUTOF10: 7

## 2022-07-06 NOTE — ED TRIAGE NOTES
Pt c/o feeling ill last week and has progressively worsened. Pt has congestion and a cough and causes CP, pt has N/D, denies vomiting. Pt is taking OTC medication for fever and aches. Pt's breathing is unlabored, pulses are good, and color is good.

## 2022-07-06 NOTE — Clinical Note
Jeremias Dunn was seen and treated in our emergency department on 7/6/2022. She may return to work on 07/08/2022. If you have any questions or concerns, please don't hesitate to call.       Bryson Barbosa, APRN - CNP

## 2022-07-06 NOTE — ED PROVIDER NOTES
[address] [facility] ED  eMERGENCY dEPARTMENT eNCOUnter      Pt Name: Rebecca Esteban  MRN: [id number]  [place] [address]  Date of evaluation: 2022  Provider: AILIN Stanley CNP      HISTORY OF PRESENT ILLNESS    Rebecca Esteban is a 25 y.o. female who presents to the Emergency Department with cough and congestion x 1 week. Patient denies SOB, fever, chills. Patient is able to eat and drink without difficulty. Pain is moderate. REVIEW OF SYSTEMS       Review of Systems   Constitutional: Negative for activity change, appetite change and fever. HENT: Positive for postnasal drip, rhinorrhea and sinus pain. Negative for congestion, drooling, tinnitus and trouble swallowing. Respiratory: Positive for cough. Negative for shortness of breath. Cardiovascular: Negative for chest pain. Gastrointestinal: Negative for abdominal pain, diarrhea, nausea and vomiting. Genitourinary: Negative for dysuria. Musculoskeletal: Negative for arthralgias, back pain and neck pain. Skin: Negative for rash. Neurological: Negative for dizziness, syncope, light-headedness and headaches. All other systems reviewed and are negative. PAST MEDICAL HISTORY     Past Medical History:   Diagnosis Date    Anemia     History of blood transfusion          SURGICAL HISTORY     History reviewed. No pertinent surgical history.       CURRENT MEDICATIONS       Previous Medications    No medications on file       ALLERGIES     Seasonal    FAMILY HISTORY       Family History   Problem Relation Age of Onset    Breast Cancer Neg Hx     Cancer Neg Hx     Colon Cancer Neg Hx     Diabetes Neg Hx     Hypertension Neg Hx      Labor Neg Hx     Stroke Neg Hx     Ovarian Cancer Neg Hx     Eclampsia Neg Hx     Spont Abortions Neg Hx           SOCIAL HISTORY       Social History     Socioeconomic History    Marital status: Single     Spouse name: None    Number of children: None    Years of education: None    Highest education level: None   Occupational History    None   Tobacco Use    Smoking status: Current Every Day Smoker     Years: 4.00     Types: Cigars    Smokeless tobacco: Never Used    Tobacco comment: black n milds   Substance and Sexual Activity    Alcohol use: No     Alcohol/week: 0.0 standard drinks    Drug use: No    Sexual activity: Yes     Partners: Male   Other Topics Concern    None   Social History Narrative    None     Social Determinants of Health     Financial Resource Strain:     Difficulty of Paying Living Expenses: Not on file   Food Insecurity:     Worried About Running Out of Food in the Last Year: Not on file    Chaz of Food in the Last Year: Not on file   Transportation Needs:     Lack of Transportation (Medical): Not on file    Lack of Transportation (Non-Medical):  Not on file   Physical Activity:     Days of Exercise per Week: Not on file    Minutes of Exercise per Session: Not on file   Stress:     Feeling of Stress : Not on file   Social Connections:     Frequency of Communication with Friends and Family: Not on file    Frequency of Social Gatherings with Friends and Family: Not on file    Attends Jehovah's witness Services: Not on file    Active Member of [address] [place] [address] [employer] or Organizations: Not on file    Attends Club or Organization Meetings: Not on file    Marital Status: Not on file   Intimate Partner Violence:     Fear of Current or Ex-Partner: Not on file    Emotionally Abused: Not on file    Physically Abused: Not on file    Sexually Abused: Not on file   Housing Stability:     Unable to Pay for Housing in the Last Year: Not on file    Number of [place] in the Last Year: Not on file    Unstable Housing in the Last Year: Not on file       SCREENINGS    [place] Coma Scale  Eye Opening: Spontaneous  Best Verbal Response: Oriented  Best Motor Response: Obeys commands  Natalia Coma Scale Score: 15 @FLOW([id number])@      PHYSICAL EXAM    (up to 7 for level 4, 8 or more for level 5)     ED Triage Vitals [07/06/22 1337]   BP Temp Temp Source Heart Rate Resp SpO2 Height Weight   107/79 98 °F (36.7 °C) Oral 96 16 100 % 5' 3\" (1.6 m) 135 lb (61.2 kg)       Physical Exam  Vitals and nursing note reviewed. Constitutional:       Appearance: She is well-developed. HENT:      Head: Normocephalic and atraumatic. Right Ear: Hearing, tympanic membrane, ear canal and external ear normal.      Left Ear: Hearing, tympanic membrane, ear canal and external ear normal.      Nose:      Right Sinus: Frontal sinus tenderness present. Left Sinus: Frontal sinus tenderness present. Mouth/Throat:      Lips: Pink. Mouth: Mucous membranes are moist.      Pharynx: Oropharynx is clear. Uvula midline. Eyes:      Conjunctiva/sclera: Conjunctivae normal.      Pupils: Pupils are equal, round, and reactive to light. Cardiovascular:      Rate and Rhythm: Normal rate and regular rhythm. Heart sounds: Normal heart sounds. Pulmonary:      Effort: Pulmonary effort is normal. No accessory muscle usage or respiratory distress. Breath sounds: Normal breath sounds. No decreased air movement. No decreased breath sounds, wheezing or rhonchi. Abdominal:      General: Bowel sounds are normal. There is no distension. Palpations: Abdomen is soft. Tenderness: There is no abdominal tenderness. Musculoskeletal:         General: Normal range of motion. Cervical back: Normal range of motion and neck supple. Skin:     General: Skin is warm and dry. Neurological:      General: No focal deficit present. Mental Status: She is alert and oriented to person, place, and time. GCS: GCS eye subscore is 4. GCS verbal subscore is 5. GCS motor subscore is 6. Cranial Nerves: Cranial nerves are intact. Sensory: Sensation is intact. Motor: Motor function is intact. Coordination: Coordination is intact. Gait: Gait is intact.       Deep Tendon Reflexes: Reflexes are normal and symmetric. Psychiatric:         Judgment: Judgment normal.           All other labs were within normal range or not returned as of this dictation. EMERGENCY DEPARTMENT COURSE and DIFFERENTIALDIAGNOSIS/MDM:   Vitals:    Vitals:    07/06/22 1337   BP: 107/79   Pulse: 96   Resp: 16   Temp: 98 °F (36.7 °C)   TempSrc: Oral   SpO2: 100%   Weight: 135 lb (61.2 kg)   Height: 5' 3\" (1.6 m)            25 yr old female with acute URI. Rx for Zithromax was given to the patient. F/U With PCP in 2-3 days. Patient verbalizes understanding. PROCEDURES:  Unless otherwise noted below, none     Procedures      FINAL IMPRESSION      1.  Acute upper respiratory infection          DISPOSITION/PLAN   DISPOSITION Decision To Discharge 07/06/2022 03:14:08 PM          AILIN Willard CNP (electronically signed)  Attending Emergency Physician     AILIN Willard CNP  07/06/22 3129

## 2023-09-02 ENCOUNTER — HOSPITAL ENCOUNTER (EMERGENCY)
Age: 23
Discharge: HOME | End: 2023-09-02
Payer: COMMERCIAL

## 2023-09-02 VITALS
RESPIRATION RATE: 20 BRPM | HEIGHT: 63 IN | TEMPERATURE: 98.1 F | DIASTOLIC BLOOD PRESSURE: 64 MMHG | HEART RATE: 61 BPM | WEIGHT: 126 LBS | BODY MASS INDEX: 22.32 KG/M2 | OXYGEN SATURATION: 100 % | SYSTOLIC BLOOD PRESSURE: 104 MMHG

## 2023-09-02 DIAGNOSIS — D64.9 ANEMIA, UNSPECIFIED TYPE: ICD-10-CM

## 2023-09-02 DIAGNOSIS — R42 LIGHTHEADEDNESS: Primary | ICD-10-CM

## 2023-09-02 DIAGNOSIS — R11.2 NAUSEA AND VOMITING, UNSPECIFIED VOMITING TYPE: ICD-10-CM

## 2023-09-02 LAB
ALBUMIN SERPL-MCNC: 5.1 G/DL (ref 3.5–4.6)
ALP SERPL-CCNC: 84 U/L (ref 40–130)
ALT SERPL-CCNC: 20 U/L (ref 0–33)
ANION GAP SERPL CALCULATED.3IONS-SCNC: 11 MEQ/L (ref 9–15)
AST SERPL-CCNC: 25 U/L (ref 0–35)
BASOPHILS # BLD: 0 K/UL (ref 0–0.2)
BASOPHILS NFR BLD: 0.5 %
BILIRUB SERPL-MCNC: 0.6 MG/DL (ref 0.2–0.7)
BUN SERPL-MCNC: 9 MG/DL (ref 6–20)
CALCIUM SERPL-MCNC: 9.2 MG/DL (ref 8.5–9.9)
CHLORIDE SERPL-SCNC: 100 MEQ/L (ref 95–107)
CO2 SERPL-SCNC: 26 MEQ/L (ref 20–31)
CREAT SERPL-MCNC: 0.43 MG/DL (ref 0.5–0.9)
EOSINOPHIL # BLD: 0 K/UL (ref 0–0.7)
EOSINOPHIL NFR BLD: 0.4 %
ERYTHROCYTE [DISTWIDTH] IN BLOOD BY AUTOMATED COUNT: 18.7 % (ref 11.5–14.5)
GLOBULIN SER CALC-MCNC: 3.2 G/DL (ref 2.3–3.5)
GLUCOSE SERPL-MCNC: 80 MG/DL (ref 70–99)
HCG UR QL: NEGATIVE
HCG, URINE, POC: NEGATIVE
HCT VFR BLD AUTO: 34.8 % (ref 37–47)
HGB BLD-MCNC: 10.5 G/DL (ref 12–16)
LYMPHOCYTES # BLD: 2 K/UL (ref 1–4.8)
LYMPHOCYTES NFR BLD: 31.1 %
Lab: 0
MCH RBC QN AUTO: 19.6 PG (ref 27–31.3)
MCHC RBC AUTO-ENTMCNC: 30.1 % (ref 33–37)
MCV RBC AUTO: 65.1 FL (ref 79.4–94.8)
MONOCYTES # BLD: 0.6 K/UL (ref 0.2–0.8)
MONOCYTES NFR BLD: 9.2 %
NEGATIVE QC PASS/FAIL: NORMAL
NEUTROPHILS # BLD: 3.7 K/UL (ref 1.4–6.5)
NEUTS SEG NFR BLD: 58.8 %
PLATELET # BLD AUTO: 468 K/UL (ref 130–400)
POSITIVE QC PASS/FAIL: NORMAL
POTASSIUM SERPL-SCNC: 3.7 MEQ/L (ref 3.4–4.9)
PROT SERPL-MCNC: 8.3 G/DL (ref 6.3–8)
RBC # BLD AUTO: 5.34 M/UL (ref 4.2–5.4)
SODIUM SERPL-SCNC: 137 MEQ/L (ref 135–144)
WBC # BLD AUTO: 6.3 K/UL (ref 4.8–10.8)

## 2023-09-02 PROCEDURE — 99284 EMERGENCY DEPT VISIT MOD MDM: CPT

## 2023-09-02 PROCEDURE — 6360000002 HC RX W HCPCS: Performed by: PHYSICIAN ASSISTANT

## 2023-09-02 PROCEDURE — 84703 CHORIONIC GONADOTROPIN ASSAY: CPT

## 2023-09-02 PROCEDURE — 80053 COMPREHEN METABOLIC PANEL: CPT

## 2023-09-02 PROCEDURE — 85025 COMPLETE CBC W/AUTO DIFF WBC: CPT

## 2023-09-02 PROCEDURE — 93005 ELECTROCARDIOGRAM TRACING: CPT | Performed by: EMERGENCY MEDICINE

## 2023-09-02 PROCEDURE — 96374 THER/PROPH/DIAG INJ IV PUSH: CPT

## 2023-09-02 PROCEDURE — 36415 COLL VENOUS BLD VENIPUNCTURE: CPT

## 2023-09-02 RX ORDER — ONDANSETRON 2 MG/ML
4 INJECTION INTRAMUSCULAR; INTRAVENOUS ONCE
Status: COMPLETED | OUTPATIENT
Start: 2023-09-02 | End: 2023-09-02

## 2023-09-02 RX ORDER — ONDANSETRON 4 MG/1
4 TABLET, FILM COATED ORAL EVERY 8 HOURS PRN
Qty: 20 TABLET | Refills: 0 | Status: SHIPPED | OUTPATIENT
Start: 2023-09-02

## 2023-09-02 RX ADMIN — ONDANSETRON 4 MG: 2 INJECTION INTRAMUSCULAR; INTRAVENOUS at 18:31

## 2023-09-02 ASSESSMENT — PAIN - FUNCTIONAL ASSESSMENT: PAIN_FUNCTIONAL_ASSESSMENT: NONE - DENIES PAIN

## 2023-09-02 ASSESSMENT — ENCOUNTER SYMPTOMS
ANAL BLEEDING: 0
NAUSEA: 1
EYE DISCHARGE: 0
ABDOMINAL DISTENTION: 0
VOICE CHANGE: 0
COUGH: 0

## 2023-09-02 NOTE — ED PROVIDER NOTES
[facility] ED  eMERGENCY dEPARTMENT eNCOUnter      Pt Name: Ita Murphy  MRN: [id number]  [address] [facility] [place] [address]  Date of evaluation: 9/2/2023  Provider: Mercedes Johns [facility]       Chief Complaint   Patient presents with    Dizziness     vomiting         HISTORY OF PRESENT ILLNESS   (Location/Symptom, Timing/Onset,Context/Setting, Quality, Duration, Modifying Factors, Severity)  Note limiting factors. Ita Murphy is a 21 y.o. female who presents to the emergency department and has nausea vomiting also has dizziness lightheadedness she notes that last time this happened she needed a blood transfusion due to anemia denies chest pain abdominal pain back pain urinary bleeding rectal bleeding hematemesis. Symptoms mild severity nothing improves her symptoms. HPI    NursingNotes were reviewed. REVIEW OF SYSTEMS    (2-9 systems for level 4, 10 or more for level 5)     Review of Systems   Constitutional:  Negative for activity change, appetite change and unexpected weight change. HENT:  Negative for ear discharge, nosebleeds and voice change. Eyes:  Negative for discharge. Respiratory:  Negative for cough. Cardiovascular:  Negative for chest pain. Gastrointestinal:  Positive for nausea. Negative for abdominal distention and anal bleeding. Genitourinary:  Negative for hematuria. Musculoskeletal:  Negative for joint swelling. Skin:  Negative for pallor. Neurological:  Positive for light-headedness. Negative for seizures, syncope and facial asymmetry. Hematological:  Does not bruise/bleed easily. Psychiatric/Behavioral:  Negative for behavioral problems, self-injury and sleep disturbance. All other systems reviewed and are negative. Except as noted above the remainder of the review of systems was reviewed and negative.        PAST MEDICAL HISTORY     Past Medical History:   Diagnosis Date    Anemia     History of blood transfusion

## 2023-09-02 NOTE — DISCHARGE INSTRUCTIONS
Drink plenty of fluids. Take multivitamin with iron as directed on packaging follow-up with primary care. Return to if any symptoms worsen or new symptoms develop.

## 2023-09-02 NOTE — ED NOTES
1835, patient reports dizziness while changing positions during orthostatic blood pressures. Patient has significant change in HR when going from lying to sitting.  Electronically signed by Vangie Scanlon RN on 9/2/2023        Vangie Scanlon RN  09/02/23 1764

## 2023-09-02 NOTE — ED TRIAGE NOTES
Pt presents to ER from work with vomiting that occurred yesterday into today as well as dizziness. Pt has had a h/o blood transfusion due to anemia, she stated, \"I probably had my last one two years ago\". Pt is A&Ox4, warm and dry at this time.

## 2023-09-05 LAB
EKG ATRIAL RATE: 59 BPM
EKG P AXIS: 35 DEGREES
EKG P-R INTERVAL: 158 MS
EKG Q-T INTERVAL: 410 MS
EKG QRS DURATION: 76 MS
EKG QTC CALCULATION (BAZETT): 405 MS
EKG R AXIS: 72 DEGREES
EKG T AXIS: 54 DEGREES
EKG VENTRICULAR RATE: 59 BPM

## 2023-12-24 ENCOUNTER — HOSPITAL ENCOUNTER (EMERGENCY)
Age: 23
Discharge: HOME | End: 2023-12-24
Payer: COMMERCIAL

## 2023-12-24 VITALS
DIASTOLIC BLOOD PRESSURE: 79 MMHG | TEMPERATURE: 98.6 F | RESPIRATION RATE: 16 BRPM | SYSTOLIC BLOOD PRESSURE: 113 MMHG | OXYGEN SATURATION: 100 % | HEART RATE: 86 BPM | BODY MASS INDEX: 21.26 KG/M2 | WEIGHT: 120 LBS | HEIGHT: 63 IN

## 2023-12-24 DIAGNOSIS — Z34.90 EARLY STAGE OF PREGNANCY: Primary | ICD-10-CM

## 2023-12-24 LAB
ALBUMIN SERPL-MCNC: 4.1 G/DL (ref 3.5–4.6)
ALP SERPL-CCNC: 70 U/L (ref 40–130)
ALT SERPL-CCNC: 23 U/L (ref 0–33)
ANION GAP SERPL CALCULATED.3IONS-SCNC: 11 MEQ/L (ref 9–15)
ANISOCYTOSIS BLD QL SMEAR: ABNORMAL
AST SERPL-CCNC: 22 U/L (ref 0–35)
BACTERIA URNS QL MICRO: NEGATIVE /HPF
BASOPHILS # BLD: 0 K/UL (ref 0–0.2)
BASOPHILS NFR BLD: 0.4 %
BILIRUB SERPL-MCNC: 0.7 MG/DL (ref 0.2–0.7)
BILIRUB UR QL STRIP: NEGATIVE
BUN SERPL-MCNC: 11 MG/DL (ref 6–20)
CALCIUM SERPL-MCNC: 8.7 MG/DL (ref 8.5–9.9)
CHLORIDE SERPL-SCNC: 104 MEQ/L (ref 95–107)
CLARITY UR: ABNORMAL
CO2 SERPL-SCNC: 23 MEQ/L (ref 20–31)
COLOR UR: ABNORMAL
CREAT SERPL-MCNC: 0.46 MG/DL (ref 0.5–0.9)
EOSINOPHIL # BLD: 0.1 K/UL (ref 0–0.7)
EOSINOPHIL NFR BLD: 1.3 %
EPI CELLS #/AREA URNS AUTO: ABNORMAL /HPF (ref 0–5)
ERYTHROCYTE [DISTWIDTH] IN BLOOD BY AUTOMATED COUNT: 19.1 % (ref 11.5–14.5)
GLOBULIN SER CALC-MCNC: 2.9 G/DL (ref 2.3–3.5)
GLUCOSE SERPL-MCNC: 95 MG/DL (ref 70–99)
GLUCOSE UR STRIP-MCNC: NEGATIVE MG/DL
GONADOTROPIN, CHORIONIC (HCG) QUANT: 1451 MIU/ML
HCG, URINE, POC: POSITIVE
HCT VFR BLD AUTO: 30.1 % (ref 37–47)
HGB BLD-MCNC: 8.7 G/DL (ref 12–16)
HGB UR QL STRIP: ABNORMAL
HYALINE CASTS #/AREA URNS AUTO: ABNORMAL /HPF (ref 0–5)
HYPOCHROMIA BLD QL SMEAR: ABNORMAL
KETONES UR STRIP-MCNC: 15 MG/DL
LEUKOCYTE ESTERASE UR QL STRIP: ABNORMAL
LIPASE SERPL-CCNC: 17 U/L (ref 12–95)
LYMPHOCYTES # BLD: 1.1 K/UL (ref 1–4.8)
LYMPHOCYTES NFR BLD: 16.6 %
Lab: NORMAL
MCH RBC QN AUTO: 18.8 PG (ref 27–31.3)
MCHC RBC AUTO-ENTMCNC: 28.9 % (ref 33–37)
MCV RBC AUTO: 65 FL (ref 79.4–94.8)
MICROCYTES BLD QL SMEAR: ABNORMAL
MONOCYTES # BLD: 0.5 K/UL (ref 0.2–0.8)
MONOCYTES NFR BLD: 7.4 %
NEGATIVE QC PASS/FAIL: NORMAL
NEUTROPHILS # BLD: 5 K/UL (ref 1.4–6.5)
NEUTS SEG NFR BLD: 74.2 %
NITRITE UR QL STRIP: NEGATIVE
OVALOCYTES BLD QL SMEAR: ABNORMAL
PH UR STRIP: 5.5 [PH] (ref 5–9)
PLATELET # BLD AUTO: 457 K/UL (ref 130–400)
POIKILOCYTOSIS BLD QL SMEAR: ABNORMAL
POLYCHROMASIA BLD QL SMEAR: ABNORMAL
POSITIVE QC PASS/FAIL: NORMAL
POTASSIUM SERPL-SCNC: 3.2 MEQ/L (ref 3.4–4.9)
PROT SERPL-MCNC: 7 G/DL (ref 6.3–8)
PROT UR STRIP-MCNC: 30 MG/DL
RBC # BLD AUTO: 4.63 M/UL (ref 4.2–5.4)
RBC #/AREA URNS AUTO: >100 /HPF (ref 0–5)
SLIDE REVIEW: ABNORMAL
SODIUM SERPL-SCNC: 138 MEQ/L (ref 135–144)
SP GR UR STRIP: 1.03 (ref 1–1.03)
TARGETS BLD QL SMEAR: ABNORMAL
URINE REFLEX TO CULTURE: YES
UROBILINOGEN UR STRIP-ACNC: 1 E.U./DL
WBC # BLD AUTO: 6.8 K/UL (ref 4.8–10.8)
WBC #/AREA URNS AUTO: ABNORMAL /HPF (ref 0–5)

## 2023-12-24 PROCEDURE — 81001 URINALYSIS AUTO W/SCOPE: CPT

## 2023-12-24 PROCEDURE — 96374 THER/PROPH/DIAG INJ IV PUSH: CPT

## 2023-12-24 PROCEDURE — 84702 CHORIONIC GONADOTROPIN TEST: CPT

## 2023-12-24 PROCEDURE — 2580000003 HC RX 258

## 2023-12-24 PROCEDURE — 6370000000 HC RX 637 (ALT 250 FOR IP)

## 2023-12-24 PROCEDURE — 87086 URINE CULTURE/COLONY COUNT: CPT

## 2023-12-24 PROCEDURE — 36415 COLL VENOUS BLD VENIPUNCTURE: CPT

## 2023-12-24 PROCEDURE — 85025 COMPLETE CBC W/AUTO DIFF WBC: CPT

## 2023-12-24 PROCEDURE — 99284 EMERGENCY DEPT VISIT MOD MDM: CPT

## 2023-12-24 PROCEDURE — 83690 ASSAY OF LIPASE: CPT

## 2023-12-24 PROCEDURE — 6360000002 HC RX W HCPCS

## 2023-12-24 PROCEDURE — 80053 COMPREHEN METABOLIC PANEL: CPT

## 2023-12-24 RX ORDER — ACETAMINOPHEN 325 MG/1
650 TABLET ORAL ONCE
Status: COMPLETED | OUTPATIENT
Start: 2023-12-24 | End: 2023-12-24

## 2023-12-24 RX ORDER — 0.9 % SODIUM CHLORIDE 0.9 %
1000 INTRAVENOUS SOLUTION INTRAVENOUS ONCE
Status: COMPLETED | OUTPATIENT
Start: 2023-12-24 | End: 2023-12-24

## 2023-12-24 RX ORDER — MORPHINE SULFATE 4 MG/ML
4 INJECTION, SOLUTION INTRAMUSCULAR; INTRAVENOUS ONCE
Status: DISCONTINUED | OUTPATIENT
Start: 2023-12-24 | End: 2023-12-24

## 2023-12-24 RX ORDER — FERROUS SULFATE 325(65) MG
325 TABLET ORAL EVERY OTHER DAY
Qty: 15 TABLET | Refills: 0 | Status: SHIPPED | OUTPATIENT
Start: 2023-12-24 | End: 2024-01-23

## 2023-12-24 RX ORDER — POTASSIUM CHLORIDE 20 MEQ/1
20 TABLET, EXTENDED RELEASE ORAL ONCE
Status: COMPLETED | OUTPATIENT
Start: 2023-12-24 | End: 2023-12-24

## 2023-12-24 RX ORDER — ONDANSETRON 2 MG/ML
4 INJECTION INTRAMUSCULAR; INTRAVENOUS ONCE
Status: COMPLETED | OUTPATIENT
Start: 2023-12-24 | End: 2023-12-24

## 2023-12-24 RX ADMIN — ONDANSETRON 4 MG: 2 INJECTION INTRAMUSCULAR; INTRAVENOUS at 18:27

## 2023-12-24 RX ADMIN — ACETAMINOPHEN 650 MG: 325 TABLET ORAL at 19:01

## 2023-12-24 RX ADMIN — POTASSIUM CHLORIDE 20 MEQ: 1500 TABLET, EXTENDED RELEASE ORAL at 19:01

## 2023-12-24 RX ADMIN — SODIUM CHLORIDE 1000 ML: 9 INJECTION, SOLUTION INTRAVENOUS at 18:26

## 2023-12-24 NOTE — ED PROVIDER NOTES
[facility] ED  EMERGENCY DEPARTMENT ENCOUNTER      Pt Name: Melecio Duval  MRN: [id number]  [address] [facility] [address]  Date of evaluation: 12/24/2023  Provider: KARON Oreilly  6:03 PM EST      CHIEF COMPLAINT       Chief Complaint   Patient presents with    Abdominal Pain     Lower abdominal pain, low back pain          HISTORY OF PRESENT ILLNESS   (Location/Symptom, Timing/Onset, Context/Setting, Quality, Duration, Modifying Factors, Severity)  Note limiting factors. Melecio Duval is a 21 y.o. female who presents to the emergency department PMHx iron deficiency anemia, vasovagal syncope. Patient presents to the emergency department for evaluation of 3-day history of mid abdominal pain. States waxing and waning in course. She did take over-the-counter analgesic medicines she is not sure exactly what they were but they did help a little bit. Patient states she did have some pain to her back yesterday but this is now resolved. States the pain is to her mid abdomen though does seem to be more concentrated to the right mid/lower quadrants. Patient denies any history of similar pain. Denies any prior abdominal surgeries. Patient states that she has had some vaginal spotting over the past day. Her last full period was on 12/10 and she states she is typically regular and does not spot. She denies any concern for any sexually transmitted infection. Denies any foul-smelling discharge, denies any vaginal irritation or itching, denies hematuria, dysuria, urinary urgency or frequency, fever or chills, vomiting, constipation or diarrhea. HPI    Nursing Notes were reviewed. REVIEW OF SYSTEMS    (2-9 systems for level 4, 10 or more for level 5)     Review of Systems   Constitutional:  Negative for chills and fever. HENT:  Negative for congestion. Eyes:  Negative for photophobia. Respiratory:  Negative for cough and shortness of breath. Cardiovascular:  Negative for chest pain.

## 2023-12-24 NOTE — ED TRIAGE NOTES
Patient states that she has had lower abdominal pain that has moved to her stomach. Patient states that she had dark discharge last night, but it is not her period, she also said that she had lower back pain that is gone, but she has this pressure and fullness feeling.

## 2023-12-26 LAB — BACTERIA UR CULT: NORMAL
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
## 2024-01-04 PROBLEM — E43 SEVERE PROTEIN-CALORIE MALNUTRITION (HCC): Status: ACTIVE | Noted: 2019-06-07

## 2024-01-04 PROBLEM — E55.9 VITAMIN D DEFICIENCY: Status: ACTIVE | Noted: 2019-06-07

## 2024-01-04 PROBLEM — D50.9 IRON DEFICIENCY ANEMIA: Status: ACTIVE | Noted: 2019-06-06

## 2024-01-04 PROBLEM — R53.83 FATIGUE: Status: ACTIVE | Noted: 2024-01-04

## 2024-01-14 ENCOUNTER — HOSPITAL ENCOUNTER (EMERGENCY)
Age: 24
Discharge: HOME | End: 2024-01-14
Attending: EMERGENCY MEDICINE
Payer: COMMERCIAL

## 2024-01-14 VITALS
RESPIRATION RATE: 18 BRPM | HEART RATE: 86 BPM | TEMPERATURE: 97 F | SYSTOLIC BLOOD PRESSURE: 118 MMHG | DIASTOLIC BLOOD PRESSURE: 74 MMHG | OXYGEN SATURATION: 100 %

## 2024-01-14 DIAGNOSIS — O20.0 THREATENED MISCARRIAGE: Primary | ICD-10-CM

## 2024-01-14 LAB
ANISOCYTOSIS BLD QL SMEAR: ABNORMAL
BASOPHILS # BLD: 0 K/UL (ref 0–0.2)
BASOPHILS NFR BLD: 0.5 %
EOSINOPHIL # BLD: 0.1 K/UL (ref 0–0.7)
EOSINOPHIL NFR BLD: 1.1 %
ERYTHROCYTE [DISTWIDTH] IN BLOOD BY AUTOMATED COUNT: 18.9 % (ref 11.5–14.5)
GONADOTROPIN, CHORIONIC (HCG) QUANT: 747.4 MIU/ML
HCT VFR BLD AUTO: 33.9 % (ref 37–47)
HGB BLD-MCNC: 9.8 G/DL (ref 12–16)
HYPOCHROMIA BLD QL SMEAR: ABNORMAL
LYMPHOCYTES # BLD: 1.6 K/UL (ref 1–4.8)
LYMPHOCYTES NFR BLD: 25.7 %
MCH RBC QN AUTO: 18.7 PG (ref 27–31.3)
MCHC RBC AUTO-ENTMCNC: 28.9 % (ref 33–37)
MCV RBC AUTO: 64.8 FL (ref 79.4–94.8)
MONOCYTES # BLD: 0.5 K/UL (ref 0.2–0.8)
MONOCYTES NFR BLD: 8.3 %
NEUTROPHILS # BLD: 4.1 K/UL (ref 1.4–6.5)
NEUTS SEG NFR BLD: 64.1 %
PLATELET # BLD AUTO: 510 K/UL (ref 130–400)
POIKILOCYTOSIS BLD QL SMEAR: ABNORMAL
POLYCHROMASIA BLD QL SMEAR: ABNORMAL
RBC # BLD AUTO: 5.23 M/UL (ref 4.2–5.4)
SLIDE REVIEW: ABNORMAL
TARGETS BLD QL SMEAR: ABNORMAL
WBC # BLD AUTO: 6.4 K/UL (ref 4.8–10.8)

## 2024-01-14 PROCEDURE — 85025 COMPLETE CBC W/AUTO DIFF WBC: CPT

## 2024-01-14 PROCEDURE — 99283 EMERGENCY DEPT VISIT LOW MDM: CPT

## 2024-01-14 PROCEDURE — 36415 COLL VENOUS BLD VENIPUNCTURE: CPT

## 2024-01-14 PROCEDURE — 84702 CHORIONIC GONADOTROPIN TEST: CPT

## 2024-01-14 ASSESSMENT — LIFESTYLE VARIABLES
HOW MANY STANDARD DRINKS CONTAINING ALCOHOL DO YOU HAVE ON A TYPICAL DAY: 1 OR 2
HOW OFTEN DO YOU HAVE A DRINK CONTAINING ALCOHOL: MONTHLY OR LESS

## 2024-01-14 ASSESSMENT — PAIN - FUNCTIONAL ASSESSMENT: PAIN_FUNCTIONAL_ASSESSMENT: 0-10

## 2024-01-14 ASSESSMENT — PAIN DESCRIPTION - DESCRIPTORS: DESCRIPTORS: CRAMPING

## 2024-01-14 ASSESSMENT — PAIN DESCRIPTION - LOCATION: LOCATION: ABDOMEN

## 2024-01-14 NOTE — ED PROVIDER NOTES
MCV 64.8 (*)     MCH 18.7 (*)     MCHC 28.9 (*)     RDW 18.9 (*)     Platelets 510 (*)     All other components within normal limits   HCG, QUANTITATIVE, PREGNANCY       All other labs were within normal range or not returned as of this dictation.    EMERGENCY DEPARTMENT COURSE and DIFFERENTIAL DIAGNOSIS/MDM:   Vitals:    Vitals:    01/14/24 1658   BP: 118/74   Pulse: 86   Resp: 18   Temp: 97 °F (36.1 °C)   TempSrc: Temporal   SpO2: 100%       Patient presents with vaginal bleeding in pregnancy.  Patient had a quantitative hCG December 24 that was 1400.  Today it has dropped by half and is 700 some.  Most likely she is miscarrying.  Patient has an appointment in 4 days with her obstetrician.  Hemoglobin stable vital stable    Medical Decision Making  Amount and/or Complexity of Data Reviewed  Labs: ordered.          REASSESSMENT          CRITICAL CARE TIME   Total Critical Care time was 0 minutes, excluding separately reportable procedures.  There was a high probability of clinically significant/life threatening deterioration in the patient's condition which required my urgent intervention.      CONSULTS:  None    PROCEDURES:  Unless otherwise noted below, none     Procedures        FINAL IMPRESSION      1. Threatened miscarriage          DISPOSITION/PLAN   DISPOSITION Decision To Discharge 01/14/2024 06:37:38 PM      PATIENT REFERRED TO:  Leslee Marcum APRN [facility]  [address] [address]  [phone]    Schedule an appointment as soon as possible for a visit         DISCHARGE MEDICATIONS:  New Prescriptions    No medications on file     Controlled Substances Monitoring:          No data to display                (Please note that portions of this note were completed with a voice recognition program.  Efforts were made to edit the dictations but occasionally words are mis-transcribed.)    Poly Bolanos,  (electronically signed)  Attending Emergency Physician            Poly Bolanos,

## 2024-01-14 NOTE — ED TRIAGE NOTES
Pt to ED due to vaginal bleeding. Pt states she has had bleeding for multiple weeks and today started having clots. Pt states she found out she was pregnant 3 weeks ago.

## 2024-01-17 ENCOUNTER — OFFICE VISIT (OUTPATIENT)
Dept: OBGYN CLINIC | Age: 24
End: 2024-01-17
Payer: COMMERCIAL

## 2024-01-17 VITALS
DIASTOLIC BLOOD PRESSURE: 60 MMHG | WEIGHT: 121 LBS | SYSTOLIC BLOOD PRESSURE: 102 MMHG | BODY MASS INDEX: 21.43 KG/M2 | HEART RATE: 97 BPM

## 2024-01-17 DIAGNOSIS — O03.9 SPONTANEOUS MISCARRIAGE: Primary | ICD-10-CM

## 2024-01-17 PROCEDURE — G8484 FLU IMMUNIZE NO ADMIN: HCPCS | Performed by: ADVANCED PRACTICE MIDWIFE

## 2024-01-17 PROCEDURE — G8420 CALC BMI NORM PARAMETERS: HCPCS | Performed by: ADVANCED PRACTICE MIDWIFE

## 2024-01-17 PROCEDURE — G8427 DOCREV CUR MEDS BY ELIG CLIN: HCPCS | Performed by: ADVANCED PRACTICE MIDWIFE

## 2024-01-17 PROCEDURE — 99203 OFFICE O/P NEW LOW 30 MIN: CPT | Performed by: ADVANCED PRACTICE MIDWIFE

## 2024-01-17 PROCEDURE — 4004F PT TOBACCO SCREEN RCVD TLK: CPT | Performed by: ADVANCED PRACTICE MIDWIFE

## 2024-01-17 ASSESSMENT — ENCOUNTER SYMPTOMS
CONSTIPATION: 0
NAUSEA: 0
COUGH: 0
SHORTNESS OF BREATH: 0
VOMITING: 0
DIARRHEA: 0
ABDOMINAL PAIN: 0

## 2024-01-17 NOTE — PROGRESS NOTES
SUBJECTIVE:  Diya Mccray is a 23 y.o. female who presents here today for complaints of:      Chief Complaint   Patient presents with    ED Follow-up     Seen at ED for bleeding in early pregnancy 12/24 and 1/14 , hcgs have been drawn      Spontaneous Miscarriage  Recently learned she was pregnant during an ER visit on 12/24/23.  She returned to the ER on 1/14/24 following onset of heavy bleeding, cramping, and the passage of tissue.  Bleeding has now decreased to light/spotting.    Review of Systems   Respiratory:  Negative for cough and shortness of breath.    Gastrointestinal:  Negative for abdominal pain, constipation, diarrhea, nausea and vomiting.   Genitourinary:  Negative for difficulty urinating, dysuria, menstrual problem, pelvic pain, vaginal bleeding and vaginal discharge.   All other systems reviewed and are negative.    OBJECTIVE:  Vitals:  /60 (Site: Right Upper Arm, Position: Sitting, Cuff Size: Medium Adult)   Pulse 97   Wt 54.9 kg (121 lb)   LMP 12/10/2023 (Approximate)   BMI 21.43 kg/m²     hCG Quant   Date Value Ref Range Status   01/14/2024 747.4 mIU/mL Final     Comment:     Gestational Age          Expected HCG values (mIU/ml)      3 weeks                           5-72      4 weeks                               5 weeks                      217-8,245      6 weeks                     152-32,177      8 weeks                 31,[phone],094     12 weeks                 27,107-201,615     16 weeks                   8,904-55,332     18 weeks                   9,649-55,271     12/24/2023 1451.0 mIU/mL Final     Comment:     Gestational Age          Expected HCG values (mIU/ml)      3 weeks                           5-72      4 weeks                               5 weeks                      217-8,245      6 weeks                     152-32,177      8 weeks                 31,[phone],094     12 weeks                 27,107-201,615     16 weeks

## 2024-01-29 DIAGNOSIS — O03.9 SPONTANEOUS MISCARRIAGE: ICD-10-CM

## 2024-01-29 LAB — GONADOTROPIN, CHORIONIC (HCG) QUANT: 247.3 MIU/ML

## 2025-02-12 ENCOUNTER — HOSPITAL ENCOUNTER (EMERGENCY)
Age: 25
Discharge: HOME | End: 2025-02-12
Attending: STUDENT IN AN ORGANIZED HEALTH CARE EDUCATION/TRAINING PROGRAM
Payer: COMMERCIAL

## 2025-02-12 VITALS
SYSTOLIC BLOOD PRESSURE: 109 MMHG | DIASTOLIC BLOOD PRESSURE: 76 MMHG | BODY MASS INDEX: 21.26 KG/M2 | WEIGHT: 120 LBS | HEIGHT: 63 IN | OXYGEN SATURATION: 100 % | TEMPERATURE: 97.9 F | RESPIRATION RATE: 19 BRPM | HEART RATE: 73 BPM

## 2025-02-12 DIAGNOSIS — D64.9 CHRONIC ANEMIA: ICD-10-CM

## 2025-02-12 DIAGNOSIS — O46.90 VAGINAL BLEEDING IN PREGNANCY: Primary | ICD-10-CM

## 2025-02-12 LAB
ABO/RH: NORMAL
ALBUMIN SERPL-MCNC: 4.3 G/DL (ref 3.5–4.6)
ALP SERPL-CCNC: 71 U/L (ref 40–130)
ALT SERPL-CCNC: 13 U/L (ref 0–33)
ANION GAP SERPL CALCULATED.3IONS-SCNC: 10 MEQ/L (ref 9–15)
ANISOCYTOSIS BLD QL SMEAR: ABNORMAL
AST SERPL-CCNC: 20 U/L (ref 0–35)
BACTERIA URNS QL MICRO: NEGATIVE /HPF
BASOPHILS # BLD: 0 K/UL (ref 0–0.2)
BASOPHILS NFR BLD: 0.6 %
BILIRUB SERPL-MCNC: 0.3 MG/DL (ref 0.2–0.7)
BILIRUB UR QL STRIP: NEGATIVE
BUN SERPL-MCNC: 10 MG/DL (ref 6–20)
CALCIUM SERPL-MCNC: 9.2 MG/DL (ref 8.5–9.9)
CHLORIDE SERPL-SCNC: 101 MEQ/L (ref 95–107)
CLARITY UR: ABNORMAL
CLUE CELLS VAG QL WET PREP: NORMAL
CO2 SERPL-SCNC: 25 MEQ/L (ref 20–31)
COLOR UR: YELLOW
CREAT SERPL-MCNC: 0.48 MG/DL (ref 0.5–0.9)
EOSINOPHIL # BLD: 0.1 K/UL (ref 0–0.7)
EOSINOPHIL NFR BLD: 1.4 %
EPI CELLS #/AREA URNS AUTO: ABNORMAL /HPF (ref 0–5)
ERYTHROCYTE [DISTWIDTH] IN BLOOD BY AUTOMATED COUNT: 19.8 % (ref 11.5–14.5)
GLOBULIN SER CALC-MCNC: 3.1 G/DL (ref 2.3–3.5)
GLUCOSE SERPL-MCNC: 78 MG/DL (ref 70–99)
GLUCOSE UR STRIP-MCNC: NEGATIVE MG/DL
GONADOTROPIN, CHORIONIC (HCG) QUANT: 210.7 MIU/ML
HCT VFR BLD AUTO: 39.3 % (ref 37–47)
HGB BLD-MCNC: 11.6 G/DL (ref 12–16)
HGB UR QL STRIP: ABNORMAL
HYALINE CASTS #/AREA URNS AUTO: ABNORMAL /HPF (ref 0–5)
KETONES UR STRIP-MCNC: NEGATIVE MG/DL
LEUKOCYTE ESTERASE UR QL STRIP: ABNORMAL
LYMPHOCYTES # BLD: 1.3 K/UL (ref 1–4.8)
LYMPHOCYTES NFR BLD: 25.9 %
MCH RBC QN AUTO: 21.2 PG (ref 27–31.3)
MCHC RBC AUTO-ENTMCNC: 29.5 % (ref 33–37)
MCV RBC AUTO: 71.7 FL (ref 79.4–94.8)
MICROCYTES BLD QL SMEAR: ABNORMAL
MONOCYTES # BLD: 0.4 K/UL (ref 0.2–0.8)
MONOCYTES NFR BLD: 8 %
NEUTROPHILS # BLD: 3.2 K/UL (ref 1.4–6.5)
NEUTS SEG NFR BLD: 63.9 %
NITRITE UR QL STRIP: NEGATIVE
OVALOCYTES BLD QL SMEAR: ABNORMAL
PH UR STRIP: 7 [PH] (ref 5–9)
PLATELET # BLD AUTO: 366 K/UL (ref 130–400)
PLATELET BLD QL SMEAR: ADEQUATE
POIKILOCYTOSIS BLD QL SMEAR: ABNORMAL
POTASSIUM SERPL-SCNC: 4 MEQ/L (ref 3.4–4.9)
PROT SERPL-MCNC: 7.4 G/DL (ref 6.3–8)
PROT UR STRIP-MCNC: NEGATIVE MG/DL
RBC # BLD AUTO: 5.48 M/UL (ref 4.2–5.4)
RBC #/AREA URNS AUTO: ABNORMAL /HPF (ref 0–5)
REASON FOR REJECTION: NORMAL
REJECTED TEST: NORMAL
SLIDE REVIEW: ABNORMAL
SODIUM SERPL-SCNC: 136 MEQ/L (ref 135–144)
SP GR UR STRIP: 1.02 (ref 1–1.03)
T VAGINALIS VAG QL WET PREP: NORMAL
TRICHOMONAS VAGINALIS SCREEN: NEGATIVE
URINE REFLEX TO CULTURE: YES
UROBILINOGEN UR STRIP-ACNC: 0.2 E.U./DL
WBC # BLD AUTO: 5 K/UL (ref 4.8–10.8)
WBC #/AREA URNS AUTO: ABNORMAL /HPF (ref 0–5)
YEAST VAG QL WET PREP: NORMAL

## 2025-02-12 PROCEDURE — 87491 CHLMYD TRACH DNA AMP PROBE: CPT

## 2025-02-12 PROCEDURE — 87086 URINE CULTURE/COLONY COUNT: CPT

## 2025-02-12 PROCEDURE — 86403 PARTICLE AGGLUT ANTBDY SCRN: CPT

## 2025-02-12 PROCEDURE — 87808 TRICHOMONAS ASSAY W/OPTIC: CPT

## 2025-02-12 PROCEDURE — 80053 COMPREHEN METABOLIC PANEL: CPT

## 2025-02-12 PROCEDURE — 84702 CHORIONIC GONADOTROPIN TEST: CPT

## 2025-02-12 PROCEDURE — 87591 N.GONORRHOEAE DNA AMP PROB: CPT

## 2025-02-12 PROCEDURE — 99284 EMERGENCY DEPT VISIT MOD MDM: CPT

## 2025-02-12 PROCEDURE — 87210 SMEAR WET MOUNT SALINE/INK: CPT

## 2025-02-12 PROCEDURE — 85025 COMPLETE CBC W/AUTO DIFF WBC: CPT

## 2025-02-12 PROCEDURE — 81001 URINALYSIS AUTO W/SCOPE: CPT

## 2025-02-12 ASSESSMENT — PAIN - FUNCTIONAL ASSESSMENT: PAIN_FUNCTIONAL_ASSESSMENT: 0-10

## 2025-02-12 ASSESSMENT — LIFESTYLE VARIABLES
HOW OFTEN DO YOU HAVE A DRINK CONTAINING ALCOHOL: NEVER
HOW MANY STANDARD DRINKS CONTAINING ALCOHOL DO YOU HAVE ON A TYPICAL DAY: PATIENT DOES NOT DRINK

## 2025-02-12 ASSESSMENT — PAIN SCALES - GENERAL: PAINLEVEL_OUTOF10: 6

## 2025-02-12 NOTE — ED PROVIDER NOTES
[facility] EMERGENCY DEPARTMENT  Emergency Department Encounter  Emergency Medicine      Pt Name: Diya Mccray  MRN:[id number]  Birthdate 2000  Date of evaluation: 25  Time: 4:12 PM EST  PCP:  Jorge Fragoso MD    CHIEF COMPLAINT       Chief Complaint   Patient presents with    Abdominal Cramping     Bleeding, just found out pregnant. First ob appointment tomorrow.        HISTORY OF PRESENT ILLNESS  (Location/Symptom, Timing/Onset, Context/Setting, Quality, Duration, ModifyingFactors, Severity.)      Diya Mccray is a 24 y.o. female  PMH of anemia presents with abdominal cramping and bleeding.  Patient said she found out she was pregnant about 4 weeks ago.  She had some spotting today with a very small clot that she noted.  She had some abdominal cramping earlier today but none now.  She denies any trauma admitted to a little bit of discharge, denies any dysuria hematuria.  Denies any diarrhea no nausea vomiting no fevers chills or cough.  She did take a pediatric vitamins.  She has her first OB appointment tomorrow scheduled.  She had a miscarriage before about a year ago.  Prior to that she had healthy pregnancy, her son is 9 years old.  Patient has known anemia, no other medical problems, denies any alcohol or drug use, no back pain, no lightheadedness, currently she is feeling well    PAST MEDICAL / SURGICAL / SOCIAL /FAMILY HISTORY      has a past medical history of Anemia and History of blood transfusion.  No other pertinent PMH on review with patient/guardian.     has no past surgical history on file.  No other pertinent PSH on review with patient/guardian.  Social History     Socioeconomic History    Marital status: Single     Spouse name: Not on file    Number of children: Not on file    Years of education: Not on file    Highest education level: Not on file   Occupational History    Not on file   Tobacco Use    Smoking status: Former     Types: Cigarettes    Smokeless

## 2025-02-12 NOTE — DISCHARGE INSTRUCTIONS
Keep your follow-up appoint with your OB/GYN tomorrow as scheduled.     Repeat your blood work in 2 days    Potential miscarriage has not been ruled out in your case of pregnancy.   Use Tylenol (unless prescribed medications that have Tylenol in it) for pain.      Return to the Emergency Department for worsening of vaginal bleeding, using more than 4 pads per hour, feeling of weakness, dizzy, nausea / vomiting, abdominal pain, lightheadedness, any other care or concern.

## 2025-02-13 ENCOUNTER — OFFICE VISIT (OUTPATIENT)
Dept: OBGYN CLINIC | Age: 25
End: 2025-02-13
Payer: COMMERCIAL

## 2025-02-13 VITALS
HEART RATE: 108 BPM | BODY MASS INDEX: 20.19 KG/M2 | DIASTOLIC BLOOD PRESSURE: 60 MMHG | SYSTOLIC BLOOD PRESSURE: 104 MMHG | WEIGHT: 114 LBS

## 2025-02-13 DIAGNOSIS — O20.0 THREATENED MISCARRIAGE IN EARLY PREGNANCY: Primary | ICD-10-CM
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
## 2025-02-13 PROBLEM — R53.83 FATIGUE: Status: RESOLVED | Noted: 2024-01-04 | Resolved: 2025-02-13

## 2025-02-13 PROBLEM — E43 SEVERE PROTEIN-CALORIE MALNUTRITION: Status: RESOLVED | Noted: 2019-06-07 | Resolved: 2025-02-13

## 2025-02-13 PROBLEM — E55.9 VITAMIN D DEFICIENCY: Status: RESOLVED | Noted: 2019-06-07 | Resolved: 2025-02-13

## 2025-02-13 PROBLEM — D50.9 IRON DEFICIENCY ANEMIA: Status: RESOLVED | Noted: 2019-06-06 | Resolved: 2025-02-13

## 2025-02-13 LAB
BACTERIA UR CULT: ABNORMAL
BACTERIA UR CULT: ABNORMAL
ORGANISM: ABNORMAL

## 2025-02-13 PROCEDURE — 4004F PT TOBACCO SCREEN RCVD TLK: CPT | Performed by: ADVANCED PRACTICE MIDWIFE

## 2025-02-13 PROCEDURE — 99213 OFFICE O/P EST LOW 20 MIN: CPT | Performed by: ADVANCED PRACTICE MIDWIFE

## 2025-02-13 PROCEDURE — G8427 DOCREV CUR MEDS BY ELIG CLIN: HCPCS | Performed by: ADVANCED PRACTICE MIDWIFE

## 2025-02-13 PROCEDURE — G8420 CALC BMI NORM PARAMETERS: HCPCS | Performed by: ADVANCED PRACTICE MIDWIFE

## 2025-02-13 ASSESSMENT — ENCOUNTER SYMPTOMS
DIARRHEA: 0
VOMITING: 1
ABDOMINAL PAIN: 0
CONSTIPATION: 0
SHORTNESS OF BREATH: 0
COUGH: 0
NAUSEA: 1
BACK PAIN: 1

## 2025-02-13 NOTE — PROGRESS NOTES
SUBJECTIVE:  Diya Mccray is a 24 y.o. female who presents here today for complaints of:      Chief Complaint   Patient presents with    ED Follow-up     She was seen at the ER yesterday for a possible miscarriage , she was spotting at the time, but is now heavily bleeding and cramping .      Bleeding in Early Pregnancy  Positive pregnancy test about 4 weeks ago, unsure of LMP (sometime possibly in December), now with moderate bleeding and cramping.  She had a baseline HCG drawn while in the ER yesterday, discussed repeating that HCG tomorrow for trending.    Review of Systems   Respiratory:  Negative for cough and shortness of breath.    Gastrointestinal:  Positive for nausea and vomiting. Negative for abdominal pain, constipation and diarrhea.   Genitourinary:  Positive for menstrual problem, pelvic pain (Cramping) and vaginal bleeding. Negative for difficulty urinating, dysuria and vaginal discharge.   Musculoskeletal:  Positive for back pain.   All other systems reviewed and are negative.    OBJECTIVE:  Vitals:  /60   Pulse (!) 108   Wt 51.7 kg (114 lb)   LMP 12/06/2024   BMI 20.19 kg/m²     Physical Exam  Appearance:  Normal appearance  Cardiovascular:  Normal rate, Capillary refill less than 2 seconds  Pulmonary:  Normal effort, no distress  Abdominal:  No tenderness  MS:  No Swelling, No dependent edema  Skin:  Warm, dry  Neuro:  Alert and oriented x3, reflexes normal.  Psychiatric:  Normal mood and behavior    ASSESSMENT & PLAN:   Diagnosis Orders   1. Threatened miscarriage in early pregnancy  HCG, Quantitative, Pregnancy          Threatened Miscarriage  Repeat HCG tomorrow for trending    Return in about 1 week (around 2/20/2025) for Prenatal Care Visit.    AILIN Nolan CNM

## 2025-02-15 ENCOUNTER — HOSPITAL ENCOUNTER (EMERGENCY)
Age: 25
Discharge: HOME | End: 2025-02-15
Payer: COMMERCIAL

## 2025-02-15 VITALS
HEART RATE: 84 BPM | RESPIRATION RATE: 18 BRPM | OXYGEN SATURATION: 97 % | SYSTOLIC BLOOD PRESSURE: 99 MMHG | TEMPERATURE: 98.7 F | BODY MASS INDEX: 20.2 KG/M2 | HEIGHT: 63 IN | WEIGHT: 114 LBS | DIASTOLIC BLOOD PRESSURE: 88 MMHG

## 2025-02-15 DIAGNOSIS — O03.9 MISCARRIAGE: ICD-10-CM

## 2025-02-15 DIAGNOSIS — N93.9 VAGINAL BLEEDING: Primary | ICD-10-CM

## 2025-02-15 LAB — GONADOTROPIN, CHORIONIC (HCG) QUANT: 20.6 MIU/ML

## 2025-02-15 PROCEDURE — 84702 CHORIONIC GONADOTROPIN TEST: CPT

## 2025-02-15 PROCEDURE — 36415 COLL VENOUS BLD VENIPUNCTURE: CPT

## 2025-02-15 PROCEDURE — 99283 EMERGENCY DEPT VISIT LOW MDM: CPT

## 2025-02-15 RX ORDER — PRENATAL WITH FERROUS FUM AND FOLIC ACID 3080; 920; 120; 400; 22; 1.84; 3; 20; 10; 1; 12; 200; 27; 25; 2 [IU]/1; [IU]/1; MG/1; [IU]/1; MG/1; MG/1; MG/1; MG/1; MG/1; MG/1; UG/1; MG/1; MG/1; MG/1; MG/1
1 TABLET ORAL DAILY
Qty: 30 TABLET | Refills: 0 | Status: SHIPPED | OUTPATIENT
Start: 2025-02-15

## 2025-02-15 ASSESSMENT — PAIN DESCRIPTION - PAIN TYPE: TYPE: ACUTE PAIN

## 2025-02-15 ASSESSMENT — PAIN DESCRIPTION - LOCATION: LOCATION: ABDOMEN

## 2025-02-15 ASSESSMENT — PAIN DESCRIPTION - DESCRIPTORS: DESCRIPTORS: CRAMPING

## 2025-02-15 ASSESSMENT — LIFESTYLE VARIABLES
HOW MANY STANDARD DRINKS CONTAINING ALCOHOL DO YOU HAVE ON A TYPICAL DAY: PATIENT DOES NOT DRINK
HOW OFTEN DO YOU HAVE A DRINK CONTAINING ALCOHOL: NEVER

## 2025-02-15 ASSESSMENT — PAIN SCALES - GENERAL: PAINLEVEL_OUTOF10: 6

## 2025-02-15 ASSESSMENT — PAIN - FUNCTIONAL ASSESSMENT: PAIN_FUNCTIONAL_ASSESSMENT: 0-10

## 2025-02-15 NOTE — ED TRIAGE NOTES
Pt arrived with report of vaginal bleeding   Pt was seen 2 days ago for same thing and had HCG drawn has not ha re draw   Pt states bleeding is getting worse and cramping   Pt reports clots   Unknown how  far along she is   Pt states her LMP beginning of December but unsure

## 2025-02-15 NOTE — ED PROVIDER NOTES
Basic Information   Time Seen: 4:26 PM   Primary Care Provider: Jorge Fragoso MD     Chief Complaint   Patient presents with    Vaginal Bleeding     X3 days       HPI   Diya Mccray is a 24 yrs female who presents with vaginal bleeding that started 3 days ago. She believes she is having a miscarriage. Reports she is bleeding through 1 pad within 1 hour.   Physical Exam     BP      Temp      Pulse     Resp 20 (02/15/25 7234)    SpO2         General: Awake and Alert, no acute distress   CV: RRR, S1, S2   Resp: LCTAB, even and non labored   Other:   Impression and Plan     Labs Reviewed   HCG, QUANTITATIVE, PREGNANCY        No orders to display      Final Impression   I have performed a medical screening exam on Diya Mccray. Based on this patient's chief complaint/symptoms of   Chief Complaint   Patient presents with    Vaginal Bleeding     X3 days     and my focused exam, their care will be started and transitioned to provider when room is available       Carleen Sparks, AILIN - CNP  02/15/25 9578

## 2025-02-18 LAB
C TRACH DNA CVX QL NAA+PROBE: NEGATIVE
N GONORRHOEA DNA CERV MUCUS QL NAA+PROBE: NEGATIVE

## 2025-02-25 NOTE — ED PROVIDER NOTES
8:40 PM [facility] EMERGENCY DEPARTMENT  EMERGENCY DEPARTMENT ENCOUNTER      Pt Name: Diya Mccray  MRN: [id number]  Birthdate 2000  Date of evaluation: 2/15/2025  Provider: Shelly Jaime MD    CHIEF COMPLAINT       Chief Complaint   Patient presents with    Vaginal Bleeding     X3 days          HISTORY OF PRESENT ILLNESS   (Location/Symptom, Timing/Onset, Context/Setting, Quality, Duration, Modifying Factors, Severity)  Note limiting factors.       Diya Mccray is a 25 y.o. female -0-1-1 who presents to the emergency department for chief complaint of vaginal bleeding that has been ongoing for the last 3 days.  Patient had her beta-hCG level checked 3 days ago, and presents today for a repeat check.  Patient does endorse persistent bleeding since then, and states that now she is passing clots, and states that this is familiar with regular period.  Patient does not endorse pain out of proportion.  Patient denies any other recent trauma.  Patient denies any other symptoms such as fevers, chills, chest pain, shortness of breath, rigors, confusion, altered mental status, abdominal pain, nausea, vomiting, diarrhea, focal neurological defects, headache lightheadedness or palpitations.    HPI    Nursing Notes were reviewed.    REVIEW OF SYSTEMS    (2-9 systems for level 4, 10 or more for level 5)     Review of Systems    Except as noted above the remainder of the review of systems was reviewed and negative.       PAST MEDICAL HISTORY     Past Medical History:   Diagnosis Date    Anemia     History of blood transfusion          SURGICAL HISTORY     History reviewed. No pertinent surgical history.      CURRENT MEDICATIONS       Discharge Medication List as of 2/15/2025  6:06 PM        CONTINUE these medications which have NOT CHANGED    Details   ondansetron (ZOFRAN) 4 MG tablet Take 1 tablet by mouth every 8 hours as needed for Nausea, Disp-20 tablet, R-0Normal             ALLERGIES

## 2025-03-10 ENCOUNTER — HOSPITAL ENCOUNTER (EMERGENCY)
Age: 25
Discharge: HOME | End: 2025-03-10
Payer: COMMERCIAL

## 2025-03-10 VITALS
HEART RATE: 103 BPM | TEMPERATURE: 98 F | BODY MASS INDEX: 19.95 KG/M2 | DIASTOLIC BLOOD PRESSURE: 77 MMHG | SYSTOLIC BLOOD PRESSURE: 121 MMHG | WEIGHT: 112.6 LBS | OXYGEN SATURATION: 100 % | HEIGHT: 63 IN | RESPIRATION RATE: 18 BRPM

## 2025-03-10 DIAGNOSIS — Z32.02 PREGNANCY EXAMINATION OR TEST, NEGATIVE RESULT: Primary | ICD-10-CM

## 2025-03-10 LAB
HCG, URINE, POC: NEGATIVE
Lab: NORMAL
NEGATIVE QC PASS/FAIL: NORMAL
POSITIVE QC PASS/FAIL: NORMAL

## 2025-03-10 PROCEDURE — 99282 EMERGENCY DEPT VISIT SF MDM: CPT

## 2025-03-10 ASSESSMENT — LIFESTYLE VARIABLES
HOW OFTEN DO YOU HAVE A DRINK CONTAINING ALCOHOL: 2-4 TIMES A MONTH
HOW MANY STANDARD DRINKS CONTAINING ALCOHOL DO YOU HAVE ON A TYPICAL DAY: 1 OR 2

## 2025-03-10 ASSESSMENT — PAIN - FUNCTIONAL ASSESSMENT: PAIN_FUNCTIONAL_ASSESSMENT: NONE - DENIES PAIN

## 2025-03-10 NOTE — ED TRIAGE NOTES
Patient states she had a miscarriage the beginning of Feb. Patient states having 2 miscarriages previously. Patient states coming in today for a pregnancy test to find out if she is currently pregnant. Patient states she has not had her period since her miscarriage. Patient alert and oriented.

## 2025-03-10 NOTE — ED PROVIDER NOTES
[facility] EMERGENCY DEPARTMENT  EMERGENCY DEPARTMENT ENCOUNTER      Pt Name: Diya Mccray  MRN: [id number]  Birthdate 2000  Date of evaluation: 3/10/2025  Provider: Rebecca Dykes PA-C      HISTORY OF PRESENT ILLNESS    Diya Mccray is a 25 y.o. female who presents to the Emergency Department requesting pregnancy test.  Patient had miscarriage last month and has not had a period since then.  Patient has no other complaints today and denies fever, chills, nausea, vomiting, flulike symptoms, dysuria, urinary urgency, urinary frequency, vaginal symptoms, or abdominal pain.    REVIEW OF SYSTEMS       Review of Systems      PAST MEDICAL HISTORY     Past Medical History:   Diagnosis Date    Anemia     History of blood transfusion          SURGICAL HISTORY     No past surgical history on file.      CURRENT MEDICATIONS       Previous Medications    ONDANSETRON (ZOFRAN) 4 MG TABLET    Take 1 tablet by mouth every 8 hours as needed for Nausea    PRENATAL VIT-FE FUMARATE-FA (PRENATAL VITAMIN) 27-1 MG TABS TABLET    Take 1 tablet by mouth daily       ALLERGIES     Seasonal    FAMILY HISTORY       Family History   Problem Relation Age of Onset    Breast Cancer Neg Hx     Cancer Neg Hx     Colon Cancer Neg Hx     Diabetes Neg Hx     Hypertension Neg Hx      Labor Neg Hx     Stroke Neg Hx     Ovarian Cancer Neg Hx     Eclampsia Neg Hx     Spont Abortions Neg Hx           SOCIAL HISTORY       Social History     Socioeconomic History    Marital status: Single   Tobacco Use    Smoking status: Former     Types: Cigarettes    Smokeless tobacco: Current    Tobacco comments:     \   Substance and Sexual Activity    Alcohol use: No     Alcohol/week: 0.0 standard drinks of alcohol    Drug use: No    Sexual activity: Yes     Partners: Male       SCREENINGS    [place] Coma Scale  Eye Opening: Spontaneous  Best Verbal Response: Oriented  Best Motor Response: Obeys commands  [place] Coma Scale Score: 15        PHYSICAL

## 2025-07-03 ENCOUNTER — OFFICE VISIT (OUTPATIENT)
Dept: OBGYN CLINIC | Age: 25
End: 2025-07-03

## 2025-07-03 VITALS
WEIGHT: 123 LBS | DIASTOLIC BLOOD PRESSURE: 60 MMHG | HEART RATE: 78 BPM | SYSTOLIC BLOOD PRESSURE: 114 MMHG | BODY MASS INDEX: 21.79 KG/M2

## 2025-07-03 DIAGNOSIS — Z34.92 PRENATAL CARE, SECOND TRIMESTER: ICD-10-CM

## 2025-07-03 DIAGNOSIS — Z72.51 UNPROTECTED SEXUAL INTERCOURSE: ICD-10-CM

## 2025-07-03 DIAGNOSIS — Z11.51 SCREENING FOR HUMAN PAPILLOMAVIRUS: ICD-10-CM

## 2025-07-03 DIAGNOSIS — Z32.01 POSITIVE URINE PREGNANCY TEST: ICD-10-CM

## 2025-07-03 DIAGNOSIS — O21.9 NAUSEA AND VOMITING IN PREGNANCY: ICD-10-CM

## 2025-07-03 DIAGNOSIS — N91.1 SECONDARY AMENORRHEA: ICD-10-CM

## 2025-07-03 DIAGNOSIS — Z01.419 WOMEN'S ANNUAL ROUTINE GYNECOLOGICAL EXAMINATION: Primary | ICD-10-CM

## 2025-07-03 LAB — HPV16+18+H RISK 12 DNA SPEC-IMP: NORMAL

## 2025-07-03 RX ORDER — ONDANSETRON 4 MG/1
TABLET, ORALLY DISINTEGRATING ORAL
Qty: 30 TABLET | Refills: 2 | Status: SHIPPED | OUTPATIENT
Start: 2025-07-03

## 2025-07-03 ASSESSMENT — ENCOUNTER SYMPTOMS
ABDOMINAL PAIN: 0
DIARRHEA: 0
CONSTIPATION: 0
VOMITING: 1
NAUSEA: 1
SHORTNESS OF BREATH: 0

## 2025-07-03 NOTE — PROGRESS NOTES
Chief Complaint:     Diya Mccray is a 25 y.o. female who presents here today for:      Chief Complaint   Patient presents with    Amenorrhea     Confirmation of pregnancy     History of Present Illness:     Diya Mccray is a 25 y.o. female who presents for her annual exam.      Secondary Amenorrhea  Home Pregnancy Test:  Positive  Patient's last menstrual period was 2025 (exact date).  Pregnancy Symptoms:  Missed Period, Tender Breasts, Nausea & Vomiting, Urinary Frequency, Fatigue  Pelvic pain/cramping:   No  Vaginal bleeding: No    Past Medical History:     Allergies:  Environmental/seasonal  Patient's last menstrual period was 2025 (exact date).  Obstetrical History:       Past Medical History:   Diagnosis Date    Anemia due to blood loss, chronic     Menorrhagia Related    History of blood transfusion     Due to Menorrhagia     Medications:     Current Outpatient Medications on File Prior to Visit   Medication Sig Dispense Refill    Prenatal Vit-Fe Fumarate-FA (PRENATAL VITAMIN) 27-1 MG TABS tablet Take 1 tablet by mouth daily 30 tablet 0    ondansetron (ZOFRAN) 4 MG tablet Take 1 tablet by mouth every 8 hours as needed for Nausea 20 tablet 0     No current facility-administered medications on file prior to visit.     Review of Systems:     Review of Systems   Constitutional:  Positive for appetite change and fatigue.   Eyes:  Negative for visual disturbance.   Respiratory:  Negative for shortness of breath.    Gastrointestinal:  Positive for nausea and vomiting. Negative for abdominal pain, constipation and diarrhea.   Genitourinary:  Positive for frequency. Negative for dysuria, pelvic pain, vaginal bleeding and vaginal discharge.   Neurological:  Negative for dizziness, syncope and headaches.     Physical Exam:     Vitals:  /60   Pulse 78   Wt 55.8 kg (123 lb)   LMP 2025 (Exact Date)   BMI 21.79 kg/m²     Prior Pap History:  No prior pap records    Physical

## 2025-07-04 ENCOUNTER — RESULTS FOLLOW-UP (OUTPATIENT)
Dept: OBGYN CLINIC | Age: 25
End: 2025-07-04

## 2025-07-04 DIAGNOSIS — B96.89 BACTERIAL VAGINITIS: Primary | ICD-10-CM

## 2025-07-04 DIAGNOSIS — N76.0 BACTERIAL VAGINITIS: Primary | ICD-10-CM

## 2025-07-04 LAB
CLUE CELLS VAG QL WET PREP: ABNORMAL
T VAGINALIS VAG QL WET PREP: ABNORMAL
TRICHOMONAS VAGINALIS SCREEN: NEGATIVE
YEAST VAG QL WET PREP: ABNORMAL

## 2025-07-04 RX ORDER — METRONIDAZOLE 500 MG/1
500 TABLET ORAL 2 TIMES DAILY
Qty: 14 TABLET | Refills: 0 | Status: SHIPPED | OUTPATIENT
Start: 2025-07-04 | End: 2025-07-11

## 2025-07-05 LAB
BACTERIA UR CULT: ABNORMAL
ORGANISM: ABNORMAL
ORGANISM: ABNORMAL

## 2025-07-07 DIAGNOSIS — O23.41 URINARY TRACT INFECTION IN MOTHER DURING FIRST TRIMESTER OF PREGNANCY: Primary | ICD-10-CM

## 2025-07-07 RX ORDER — NITROFURANTOIN 25; 75 MG/1; MG/1
100 CAPSULE ORAL 2 TIMES DAILY
Qty: 14 CAPSULE | Refills: 0 | Status: SHIPPED | OUTPATIENT
Start: 2025-07-07 | End: 2025-07-14

## 2025-07-08 LAB
6MAM UR QL: NOT DETECTED
7-AMINOCLONAZEPAM: NOT DETECTED
ALPHA-OH-ALPRAZOLAM: NOT DETECTED
ALPHA-OH-MIDAZOLAM, URINE: NOT DETECTED
ALPRAZOLAM: NOT DETECTED
AMPHET UR QL SCN: NOT DETECTED
BARBITURATES: NEGATIVE
BENZOYLECGONINE: NEGATIVE
BUPRENORPHINE: PRESENT
CARISOPRODOL UR QL: NEGATIVE
CLONAZEPAM UR QL: NOT DETECTED
CODEINE: NOT DETECTED
CREAT UR-MCNC: 198.8 MG/DL (ref 20–400)
DIAZEPAM: NOT DETECTED
ETHYL GLUCURONIDE: NEGATIVE
FENTANYL UR QL: NOT DETECTED
GABAPENTIN: NOT DETECTED
HYDROCODONE UR QL: NOT DETECTED
HYDROMORPHONE: NOT DETECTED
LORAZEPAM UR QL: NOT DETECTED
MARIJUANA METABOLITE: ABNORMAL
MDA: NOT DETECTED
MDEA: NOT DETECTED
MDMA UR QL: NOT DETECTED
MEPERIDINE: NOT DETECTED
METHADONE: NEGATIVE
METHAMPHETAMINE: NOT DETECTED
METHYLPHENIDATE: NOT DETECTED
MIDAZOLAM UR QL SCN: NOT DETECTED
MORPHINE: NOT DETECTED
NALOXONE: NOT DETECTED
NORBUPRENORPHINE, FREE: PRESENT
NORDIAZEPAM: NOT DETECTED
NORFENTANYL: NOT DETECTED
NORHYDROCODONE, URINE: NOT DETECTED
NOROXYCODONE: PRESENT
NOROXYMORPHONE, URINE: PRESENT
OXAZEPAM UR QL: NOT DETECTED
OXYCODONE UR QL: PRESENT
OXYMORPHONE UR QL: PRESENT
PAIN MANAGEMENT DRUG PANEL: ABNORMAL
PATHOLOGY STUDY: ABNORMAL
PCP: NEGATIVE
PHENTERMINE: NOT DETECTED
PREGABALIN: NOT DETECTED
TAPENTADOL, URINE: NOT DETECTED
TAPENTADOL-O-SULFATE, URINE: NOT DETECTED
TEMAZEPAM: NOT DETECTED
TRAMADOL: NEGATIVE
ZOLPIDEM: NOT DETECTED

## 2025-07-09 PROBLEM — R82.5 POSITIVE URINE DRUG SCREEN: Status: ACTIVE | Noted: 2025-07-09

## 2025-07-09 LAB
C TRACH DNA CVX QL NAA+PROBE: NEGATIVE
HPV HR 12 DNA SPEC QL NAA+PROBE: NOT DETECTED
HPV16 DNA SPEC QL NAA+PROBE: NOT DETECTED
HPV16+18+H RISK 12 DNA SPEC-IMP: NORMAL
HPV18 DNA SPEC QL NAA+PROBE: NOT DETECTED
N GONORRHOEA DNA SPEC QL NAA+PROBE: NEGATIVE

## 2025-07-17 ENCOUNTER — INITIAL PRENATAL (OUTPATIENT)
Dept: OBGYN CLINIC | Age: 25
End: 2025-07-17

## 2025-07-17 VITALS
WEIGHT: 124 LBS | HEART RATE: 84 BPM | DIASTOLIC BLOOD PRESSURE: 66 MMHG | BODY MASS INDEX: 21.97 KG/M2 | SYSTOLIC BLOOD PRESSURE: 116 MMHG

## 2025-07-17 DIAGNOSIS — Z3A.15 15 WEEKS GESTATION OF PREGNANCY: ICD-10-CM

## 2025-07-17 DIAGNOSIS — R82.5 POSITIVE URINE DRUG SCREEN: ICD-10-CM

## 2025-07-17 DIAGNOSIS — Z36.0 ENCOUNTER FOR ANTENATAL SCREENING FOR CHROMOSOMAL ANOMALIES: ICD-10-CM

## 2025-07-17 DIAGNOSIS — O09.32 INITIAL OBSTETRIC VISIT IN SECOND TRIMESTER: Primary | ICD-10-CM

## 2025-07-17 DIAGNOSIS — G47.9 DIFFICULTY SLEEPING: ICD-10-CM

## 2025-07-17 DIAGNOSIS — S39.012A BACK STRAIN, INITIAL ENCOUNTER: ICD-10-CM

## 2025-07-17 DIAGNOSIS — O23.41 URINARY TRACT INFECTION IN MOTHER DURING FIRST TRIMESTER OF PREGNANCY: ICD-10-CM

## 2025-07-17 LAB
ALBUMIN SERPL-MCNC: 4.1 G/DL (ref 3.5–4.6)
ALP SERPL-CCNC: 57 U/L (ref 40–130)
ALT SERPL-CCNC: 14 U/L (ref 0–33)
ANION GAP SERPL CALCULATED.3IONS-SCNC: 15 MEQ/L (ref 9–15)
AST SERPL-CCNC: 21 U/L (ref 0–35)
BASOPHILS # BLD: 0 K/UL (ref 0–0.2)
BASOPHILS NFR BLD: 0.5 %
BILIRUB SERPL-MCNC: 0.3 MG/DL (ref 0.2–0.7)
BUN SERPL-MCNC: 9 MG/DL (ref 6–20)
CALCIUM SERPL-MCNC: 8.9 MG/DL (ref 8.5–9.9)
CHLORIDE SERPL-SCNC: 104 MEQ/L (ref 95–107)
CO2 SERPL-SCNC: 20 MEQ/L (ref 20–31)
CREAT SERPL-MCNC: 0.44 MG/DL (ref 0.5–0.9)
EOSINOPHIL # BLD: 0.1 K/UL (ref 0–0.7)
EOSINOPHIL NFR BLD: 1.5 %
ERYTHROCYTE [DISTWIDTH] IN BLOOD BY AUTOMATED COUNT: 18.3 % (ref 11.5–14.5)
GLOBULIN SER CALC-MCNC: 3.3 G/DL (ref 2.3–3.5)
GLUCOSE SERPL-MCNC: 57 MG/DL (ref 70–99)
GONADOTROPIN, CHORIONIC (HCG) QUANT: NORMAL MIU/ML
HBV SURFACE AG SERPL QL IA: NORMAL
HCT VFR BLD AUTO: 38.2 % (ref 37–47)
HGB BLD-MCNC: 12.6 G/DL (ref 12–16)
LYMPHOCYTES # BLD: 1.6 K/UL (ref 1–4.8)
LYMPHOCYTES NFR BLD: 23.9 %
MCH RBC QN AUTO: 26.5 PG (ref 27–31.3)
MCHC RBC AUTO-ENTMCNC: 33 % (ref 33–37)
MCV RBC AUTO: 80.4 FL (ref 79.4–94.8)
MONOCYTES # BLD: 0.5 K/UL (ref 0.2–0.8)
MONOCYTES NFR BLD: 7.1 %
NEUTROPHILS # BLD: 4.4 K/UL (ref 1.4–6.5)
NEUTS SEG NFR BLD: 66.7 %
PLATELET # BLD AUTO: 273 K/UL (ref 130–400)
POTASSIUM SERPL-SCNC: 3.8 MEQ/L (ref 3.4–4.9)
PROT SERPL-MCNC: 7.4 G/DL (ref 6.3–8)
RBC # BLD AUTO: 4.75 M/UL (ref 4.2–5.4)
REAGIN+T PALLIDUM IGG+IGM SERPL-IMP: NORMAL
RUBELLA ANTIBODY IGG: >500 IU/ML
SODIUM SERPL-SCNC: 139 MEQ/L (ref 135–144)
TSH REFLEX: 1.42 UIU/ML (ref 0.44–3.86)
WBC # BLD AUTO: 6.6 K/UL (ref 4.8–10.8)

## 2025-07-17 RX ORDER — CYCLOBENZAPRINE HCL 10 MG
10 TABLET ORAL 2 TIMES DAILY PRN
Qty: 30 TABLET | Refills: 0 | Status: SHIPPED | OUTPATIENT
Start: 2025-07-17 | End: 2025-09-15

## 2025-07-17 RX ORDER — NITROFURANTOIN 25; 75 MG/1; MG/1
100 CAPSULE ORAL 2 TIMES DAILY
Qty: 14 CAPSULE | Refills: 0 | Status: SHIPPED | OUTPATIENT
Start: 2025-07-17 | End: 2025-07-24

## 2025-07-17 RX ORDER — ACETAMINOPHEN/DIPHENHYDRAMINE 500MG-25MG
1 TABLET ORAL NIGHTLY PRN
Qty: 60 TABLET | Refills: 2 | Status: SHIPPED | OUTPATIENT
Start: 2025-07-17 | End: 2026-07-17

## 2025-07-17 ASSESSMENT — ENCOUNTER SYMPTOMS
NAUSEA: 0
SHORTNESS OF BREATH: 0
CONSTIPATION: 0
VOMITING: 0
ABDOMINAL PAIN: 0
BACK PAIN: 1
DIARRHEA: 0

## 2025-07-17 NOTE — PROGRESS NOTES
Chief Complaint:     Diya Mccray is a 25 y.o. female who presents here today for complaints of:      No chief complaint on file.      History of Present Illness:     Diya Mccray is a 25 y.o. female who presents for her Initial Prenatal Visit.    Positive Drug Screen  Discussed her UDS results which were positive for oxycodone, Buprenorphine, and Marijuana.  Diya experiences frequent back pain that is often caused by the frequent heaving lifting she does while working as a [employer].  This muscle/back pain makes it very difficult for her to sleep at night.  Normally she treats the pain with Excedrin or Tylenol, but recently her Grandmother offered her one of her pills (unable to recall the name) to relieve the pain.  Discussed in depth the importance of avoiding NSAID medications and taking any medications that have not been prescribed to her, not just during pregnancy, but in general as well.  Plan made to repeat her UDS at her next visit in about 4 weeks.    UTI  She has been unable to log in to [facility], so she was unaware that she had a UTI or that an antibiotic had been sent for her.    Past Medical, Surgical, Family, and Social History:     Allergies:  Environmental/seasonal  Patient's last menstrual period was 2025 (exact date).  OB History    Para Term  AB Living   4 1 1 0 2 1   SAB IAB Ectopic Molar Multiple Live Births   2 0 0 0 0 1      # Outcome Date GA Lbr Lenin/2nd Weight Sex Type Anes PTL Lv   4 Current            3 SAB 2025     SAB      2 SAB 24     SAB      1 Term 06/11/15 38w5d  2.948 kg (6 lb 8 oz) M Vag-Spont  N GOGO      Birth Comments: Stanley        Past Medical History:   Diagnosis Date    Anemia due to blood loss, chronic     Menorrhagia Related    History of blood transfusion     Due to Menorrhagia     No past surgical history on file.  OB Genetic Screening    Patient's Age 35+ at Date of Delivery No     Cystic Fibrosis No     Thalassemia MCV<80 No

## 2025-07-18 LAB
ABO/RH: NORMAL
ANTIBODY SCREEN: NORMAL
ESTIMATED AVERAGE GLUCOSE: 91 MG/DL
HBA1C MFR BLD: 4.8 % (ref 4–6)
HEPATITIS C ANTIBODY: NONREACTIVE
HIV AG/AB: NONREACTIVE

## 2025-08-05 ENCOUNTER — HOSPITAL ENCOUNTER (OUTPATIENT)
Dept: ULTRASOUND IMAGING | Age: 25
Discharge: HOME | End: 2025-08-07
Payer: COMMERCIAL

## 2025-08-05 DIAGNOSIS — Z34.92 PRENATAL CARE, SECOND TRIMESTER: ICD-10-CM

## 2025-08-05 DIAGNOSIS — O09.32 INITIAL OBSTETRIC VISIT IN SECOND TRIMESTER: ICD-10-CM

## 2025-08-05 PROCEDURE — 76817 TRANSVAGINAL US OBSTETRIC: CPT

## 2025-08-05 PROCEDURE — 76815 OB US LIMITED FETUS(S): CPT
